# Patient Record
Sex: MALE | Race: WHITE | Employment: FULL TIME | URBAN - METROPOLITAN AREA
[De-identification: names, ages, dates, MRNs, and addresses within clinical notes are randomized per-mention and may not be internally consistent; named-entity substitution may affect disease eponyms.]

---

## 2018-02-20 ENCOUNTER — OFFICE VISIT (OUTPATIENT)
Dept: INTERNAL MEDICINE CLINIC | Age: 63
End: 2018-02-20

## 2018-02-20 VITALS
DIASTOLIC BLOOD PRESSURE: 80 MMHG | OXYGEN SATURATION: 98 % | HEART RATE: 61 BPM | WEIGHT: 217 LBS | HEIGHT: 73 IN | BODY MASS INDEX: 28.76 KG/M2 | SYSTOLIC BLOOD PRESSURE: 118 MMHG

## 2018-02-20 DIAGNOSIS — G89.29 CHRONIC PAIN OF BOTH SHOULDERS: ICD-10-CM

## 2018-02-20 DIAGNOSIS — M25.511 CHRONIC PAIN OF BOTH SHOULDERS: ICD-10-CM

## 2018-02-20 DIAGNOSIS — E53.8 B12 DEFICIENCY: ICD-10-CM

## 2018-02-20 DIAGNOSIS — M25.512 CHRONIC PAIN OF BOTH SHOULDERS: ICD-10-CM

## 2018-02-20 DIAGNOSIS — Z00.00 ANNUAL PHYSICAL EXAM: Primary | ICD-10-CM

## 2018-02-20 DIAGNOSIS — Z12.5 PROSTATE CANCER SCREENING: ICD-10-CM

## 2018-02-20 DIAGNOSIS — M25.561 RIGHT KNEE PAIN, UNSPECIFIED CHRONICITY: ICD-10-CM

## 2018-02-20 DIAGNOSIS — E78.00 PURE HYPERCHOLESTEROLEMIA: ICD-10-CM

## 2018-02-20 DIAGNOSIS — Z23 NEED FOR DIPHTHERIA-TETANUS-PERTUSSIS (TDAP) VACCINE: ICD-10-CM

## 2018-02-20 LAB
BILIRUBIN, POC: NORMAL
BLOOD URINE, POC: NORMAL
CLARITY, POC: CLEAR
COLOR, POC: NORMAL
GLUCOSE URINE, POC: NORMAL
KETONES, POC: NORMAL
LEUKOCYTE EST, POC: NORMAL
NITRITE, POC: NORMAL
PH, POC: 7
PROTEIN, POC: NORMAL
SPECIFIC GRAVITY, POC: 1.02
UROBILINOGEN, POC: 0.2

## 2018-02-20 PROCEDURE — 90471 IMMUNIZATION ADMIN: CPT | Performed by: INTERNAL MEDICINE

## 2018-02-20 PROCEDURE — 81002 URINALYSIS NONAUTO W/O SCOPE: CPT | Performed by: INTERNAL MEDICINE

## 2018-02-20 PROCEDURE — 99396 PREV VISIT EST AGE 40-64: CPT | Performed by: INTERNAL MEDICINE

## 2018-02-20 PROCEDURE — 90715 TDAP VACCINE 7 YRS/> IM: CPT | Performed by: INTERNAL MEDICINE

## 2018-02-20 ASSESSMENT — ENCOUNTER SYMPTOMS
ABDOMINAL PAIN: 0
CHEST TIGHTNESS: 0
NAUSEA: 0
EYE PAIN: 0
DIARRHEA: 0
VOMITING: 0
VOICE CHANGE: 0
RHINORRHEA: 0
WHEEZING: 0
ABDOMINAL DISTENTION: 0
SINUS PRESSURE: 0
SHORTNESS OF BREATH: 0
BACK PAIN: 0
TROUBLE SWALLOWING: 0
COLOR CHANGE: 0
COUGH: 0
CONSTIPATION: 0
APNEA: 0

## 2018-02-20 ASSESSMENT — PATIENT HEALTH QUESTIONNAIRE - PHQ9
2. FEELING DOWN, DEPRESSED OR HOPELESS: 0
SUM OF ALL RESPONSES TO PHQ9 QUESTIONS 1 & 2: 0
1. LITTLE INTEREST OR PLEASURE IN DOING THINGS: 0
SUM OF ALL RESPONSES TO PHQ QUESTIONS 1-9: 0

## 2018-03-20 LAB
A/G RATIO: 1.8 (CALC) (ref 1–2.5)
ALBUMIN SERPL-MCNC: 4.3 G/DL (ref 3.6–5.1)
ALP BLD-CCNC: 81 U/L (ref 40–115)
ALT SERPL-CCNC: 11 U/L (ref 9–46)
AST SERPL-CCNC: 13 U/L (ref 10–35)
BILIRUB SERPL-MCNC: 0.8 MG/DL (ref 0.2–1.2)
BUN / CREAT RATIO: ABNORMAL (CALC) (ref 6–22)
BUN BLDV-MCNC: 15 MG/DL (ref 7–25)
CALCIUM SERPL-MCNC: 9.2 MG/DL (ref 8.6–10.3)
CHLORIDE BLD-SCNC: 107 MMOL/L (ref 98–110)
CHOLESTEROL, TOTAL: 147 MG/DL
CHOLESTEROL/HDL RATIO: 3.2 (CALC)
CHOLESTEROL: 101 MG/DL (CALC)
CO2: 29 MMOL/L (ref 20–31)
CREAT SERPL-MCNC: 1.16 MG/DL (ref 0.7–1.25)
GFR AFRICAN AMERICAN: 77 ML/MIN/1.73M2
GFR SERPL CREATININE-BSD FRML MDRD: 67 ML/MIN/1.73M2
GLOBULIN: 2.4 G/DL (CALC) (ref 1.9–3.7)
GLUCOSE BLD-MCNC: 100 MG/DL (ref 65–99)
HDLC SERPL-MCNC: 46 MG/DL
LDL CHOLESTEROL CALCULATED: 85 MG/DL (CALC)
POTASSIUM SERPL-SCNC: 4.3 MMOL/L (ref 3.5–5.3)
PROSTATE SPECIFIC ANTIGEN: 0.9 NG/ML
SODIUM BLD-SCNC: 140 MMOL/L (ref 135–146)
TOTAL PROTEIN: 6.7 G/DL (ref 6.1–8.1)
TRIGL SERPL-MCNC: 69 MG/DL
VITAMIN B-12: 271 PG/ML (ref 200–1100)

## 2018-08-14 ENCOUNTER — TELEPHONE (OUTPATIENT)
Dept: INTERNAL MEDICINE CLINIC | Age: 63
End: 2018-08-14

## 2018-09-13 ENCOUNTER — TELEPHONE (OUTPATIENT)
Dept: INTERNAL MEDICINE CLINIC | Age: 63
End: 2018-09-13

## 2018-09-16 LAB
BUN BLDV-MCNC: 15 MG/DL
CALCIUM SERPL-MCNC: 8.8 MG/DL
CHLORIDE BLD-SCNC: 108 MMOL/L
CO2: 26 MMOL/L
CREAT SERPL-MCNC: 1.15 MG/DL
GFR CALCULATED: NORMAL
GLUCOSE BLD-MCNC: 107 MG/DL
HCT VFR BLD CALC: 41 % (ref 41–53)
HEMOGLOBIN: 14.3 G/DL (ref 13.5–17.5)
INR BLD: 1
PLATELET # BLD: 197 K/ΜL
POTASSIUM SERPL-SCNC: 4.4 MMOL/L
PROTIME: 10.3 SECONDS
SODIUM BLD-SCNC: 139 MMOL/L
WBC # BLD: 4.1 10^3/ML

## 2018-09-17 ENCOUNTER — OFFICE VISIT (OUTPATIENT)
Dept: INTERNAL MEDICINE CLINIC | Age: 63
End: 2018-09-17

## 2018-09-17 VITALS
HEIGHT: 74 IN | DIASTOLIC BLOOD PRESSURE: 70 MMHG | WEIGHT: 215.6 LBS | SYSTOLIC BLOOD PRESSURE: 112 MMHG | TEMPERATURE: 98.3 F | BODY MASS INDEX: 27.67 KG/M2 | HEART RATE: 62 BPM | OXYGEN SATURATION: 98 %

## 2018-09-17 DIAGNOSIS — G89.29 CHRONIC RIGHT SHOULDER PAIN: ICD-10-CM

## 2018-09-17 DIAGNOSIS — Z01.818 PREOPERATIVE EXAMINATION: Primary | ICD-10-CM

## 2018-09-17 DIAGNOSIS — M25.511 CHRONIC RIGHT SHOULDER PAIN: ICD-10-CM

## 2018-09-17 PROCEDURE — 93000 ELECTROCARDIOGRAM COMPLETE: CPT | Performed by: INTERNAL MEDICINE

## 2018-09-17 PROCEDURE — 99242 OFF/OP CONSLTJ NEW/EST SF 20: CPT | Performed by: INTERNAL MEDICINE

## 2018-09-17 ASSESSMENT — ENCOUNTER SYMPTOMS
CONSTIPATION: 0
VOMITING: 0
RHINORRHEA: 0
DIARRHEA: 0
ABDOMINAL PAIN: 0
SORE THROAT: 0
TROUBLE SWALLOWING: 0
COUGH: 0
NAUSEA: 0
SHORTNESS OF BREATH: 0
WHEEZING: 0

## 2018-09-17 NOTE — PROGRESS NOTES
2018     Liam Burrows (:  1955) is a 58 y.o. male, here for evaluation of the following medical concerns:    Chief Complaint   Patient presents with    Pre-op Exam     [facility] Et[facility] fax [phone] 18, total right shoulder replacement    Shoulder Pain        HPI    Planned right shoulder replacement one week from today    No recent illness  No fever/chills    Had blood work done elsewhere, [facility]    Needs ekg and mrsa swab      Current Outpatient Prescriptions   Medication Sig Dispense Refill    vitamin B-12 (CYANOCOBALAMIN) 500 MCG tablet Take 1 tablet by mouth daily 30 tablet 3    cetirizine (ZYRTEC ALLERGY) 10 MG tablet Take 1 tablet by mouth daily as needed for Allergies      Multiple Vitamins-Minerals (MULTIVITAMIN WITH MINERALS) tablet Take 2 tablets by mouth daily. 30 tablet 3     No current facility-administered medications for this visit. Allergies:  No Known Allergies  Prior Medical History:       Diagnosis Date    Hyperlipidemia      Social History  Social History   Substance Use Topics    Smoking status: Former Smoker     Packs/day: 1.00     Years: 15.00     Quit date: 1995    Smokeless tobacco: Former User     Quit date: 1995      Comment: only used chew for a few months    Alcohol use 0.0 oz/week      Comment: 6 beers/month     Prior Surgical History:      Procedure Laterality Date    COLONOSCOPY  ? Dr. Leonard Lainez - 5 year f/u    TONSILLECTOMY      WISDOM TOOTH EXTRACTION       Family History:   Family History   Problem Relation Age of Onset    Cancer Father 62        lung - smoker    Heart Disease Paternal Grandmother         ?  Diabetes Mother         not on medication    Diabetes Paternal Aunt          Review of Systems   Constitutional: Negative for appetite change, chills, fever and unexpected weight change. HENT: Negative for dental problem, rhinorrhea, sore throat and trouble swallowing. Respiratory: Negative for cough, shortness of breath and wheezing. Cardiovascular: Negative for chest pain, palpitations and leg swelling. Gastrointestinal: Negative for abdominal pain, constipation, diarrhea, nausea and vomiting. Endocrine: Negative for cold intolerance and heat intolerance. Genitourinary: Negative for difficulty urinating, dysuria and hematuria. Musculoskeletal: Positive for arthralgias. Negative for myalgias and neck pain. Skin: Negative for rash and wound. Allergic/Immunologic: Positive for environmental allergies (minor as child, not now). Negative for food allergies. Neurological: Negative for weakness, numbness and headaches. Hematological: Negative for adenopathy. Does not bruise/bleed easily. Psychiatric/Behavioral: Negative for dysphoric mood and suicidal ideas. The patient is not nervous/anxious. Prior to Visit Medications    Medication Sig Taking? Authorizing Provider   vitamin B-12 (CYANOCOBALAMIN) 500 MCG tablet Take 1 tablet by mouth daily  Adolfo Bonner MD   cetirizine (ZYRTEC ALLERGY) 10 MG tablet Take 1 tablet by mouth daily as needed for Allergies  Adolfo Bonner MD   Multiple Vitamins-Minerals (MULTIVITAMIN WITH MINERALS) tablet Take 2 tablets by mouth daily. Adolfo Bonner MD        Social History   Substance Use Topics    Smoking status: Former Smoker     Packs/day: 1.00     Years: 15.00     Quit date: 1/27/1995    Smokeless tobacco: Former User     Quit date: 1/27/1995      Comment: only used chew for a few months    Alcohol use 0.0 oz/week      Comment: 6 beers/month        Vitals:    09/17/18 1012   BP: 112/70   Site: Left Upper Arm   Position: Sitting   Cuff Size: Medium Adult   Pulse: 62   Temp: 98.3 °F (36.8 °C)   TempSrc: Oral   SpO2: 98%   Weight: 215 lb 9.6 oz (97.8 kg)   Height: 6' 2.4\" (1.89 m)     Estimated body mass index is 27.38 kg/m² as calculated from the following:    Height as of this encounter: 6' 2.4\" (1.89 m). Weight as of this encounter: 215 lb 9.6 oz (97.8 kg). Physical Exam   Constitutional: He is oriented to person, place, and time. He appears well-developed and well-nourished. HENT:   Right Ear: External ear normal.   Left Ear: External ear normal.   Nose: Nose normal.   Mouth/Throat: Oropharynx is clear and moist. No oropharyngeal exudate. Eyes: Pupils are equal, round, and reactive to light. No scleral icterus. Neck: Normal range of motion. No JVD present. Cardiovascular: Normal rate, regular rhythm, normal heart sounds and intact distal pulses. Pulmonary/Chest: Effort normal and breath sounds normal.   Abdominal: Soft. Bowel sounds are normal.   Musculoskeletal: He exhibits no edema. Neurological: He is alert and oriented to person, place, and time. He has normal reflexes. No cranial nerve deficit. Psychiatric: He has a normal mood and affect. His behavior is normal. Judgment and thought content normal.       ASSESSMENT/PLAN:  1. Preoperative examination  ASA 1  This patient appears to be in an acceptable condition for the proposed procedure. mrsa screen sent  ekg normal    Labs will be reviewed when received    2. Chronic right shoulder pain  Planned shoulder replacement  Sleep, limitations after surgery discussed      No Follow-up on file. An electronic signature was used to authenticate this note.     --Estevan Motne MD on 9/17/2018 at 10:45 AM

## 2018-09-17 NOTE — PATIENT INSTRUCTIONS
Stop all vitamin E, fish oil, aspirin, ibuprofen, aleve, or any other arthritis pill (Tylenol/acetaminophen is   OK) for seven days prior to the procedure. Eat and drink nothing after midnight the night before the planned procedure.

## 2018-09-19 ENCOUNTER — TELEPHONE (OUTPATIENT)
Dept: INTERNAL MEDICINE CLINIC | Age: 63
End: 2018-09-19

## 2018-09-19 ENCOUNTER — CLINICAL DOCUMENTATION (OUTPATIENT)
Dept: INTERNAL MEDICINE CLINIC | Age: 63
End: 2018-09-19

## 2018-09-19 DIAGNOSIS — Z01.818 PREOPERATIVE EXAMINATION: Primary | ICD-10-CM

## 2018-09-19 DIAGNOSIS — Z01.818 PREOPERATIVE EXAMINATION: ICD-10-CM

## 2018-09-19 LAB
BILIRUBIN URINE: ABNORMAL
BLOOD, URINE: NEGATIVE
CLARITY: CLEAR
COLOR: ABNORMAL
CULTURE NOSE: NORMAL
GLUCOSE URINE: NEGATIVE MG/DL
KETONES, URINE: NEGATIVE MG/DL
LEUKOCYTE ESTERASE, URINE: NEGATIVE
MICROSCOPIC EXAMINATION: ABNORMAL
NITRITE, URINE: NEGATIVE
PH UA: 5.5
PROTEIN UA: NEGATIVE MG/DL
SPECIFIC GRAVITY UA: 1.03
URINE TYPE: ABNORMAL
UROBILINOGEN, URINE: 0.2 E.U./DL

## 2018-09-19 NOTE — TELEPHONE ENCOUNTER
Pt called in wanting to do urinanalysis today per what Dr. Channing Pate is wanting done. Is there a way that the order can be placed in pt's chart for him to have that done today?    Please call pt back with information on this further at [phone]

## 2018-11-28 ENCOUNTER — OFFICE VISIT (OUTPATIENT)
Dept: INTERNAL MEDICINE CLINIC | Age: 63
End: 2018-11-28
Payer: COMMERCIAL

## 2018-11-28 VITALS
HEART RATE: 84 BPM | WEIGHT: 220 LBS | DIASTOLIC BLOOD PRESSURE: 82 MMHG | SYSTOLIC BLOOD PRESSURE: 126 MMHG | RESPIRATION RATE: 22 BRPM | TEMPERATURE: 98.8 F | BODY MASS INDEX: 27.94 KG/M2 | OXYGEN SATURATION: 95 %

## 2018-11-28 DIAGNOSIS — R73.9 HYPERGLYCEMIA: ICD-10-CM

## 2018-11-28 DIAGNOSIS — Z01.818 PREOPERATIVE EXAMINATION: Primary | ICD-10-CM

## 2018-11-28 DIAGNOSIS — G56.01 RIGHT CARPAL TUNNEL SYNDROME: ICD-10-CM

## 2018-11-28 PROCEDURE — 99242 OFF/OP CONSLTJ NEW/EST SF 20: CPT | Performed by: INTERNAL MEDICINE

## 2018-11-28 RX ORDER — TRAMADOL HYDROCHLORIDE 50 MG/1
50 TABLET ORAL EVERY 6 HOURS PRN
COMMUNITY
End: 2019-04-02 | Stop reason: CLARIF

## 2018-11-28 ASSESSMENT — ENCOUNTER SYMPTOMS
DIARRHEA: 0
NAUSEA: 0
COUGH: 0
CONSTIPATION: 0
SHORTNESS OF BREATH: 0
ABDOMINAL PAIN: 0
WHEEZING: 0
SORE THROAT: 0
TROUBLE SWALLOWING: 0
VOMITING: 0
RHINORRHEA: 0

## 2018-11-29 LAB
ATYPICAL LYMPHOCYTE RELATIVE PERCENT: ABNORMAL % (ref 0–10)
BAND NEUTROPHILS: ABNORMAL %
BANDED NEUTROPHILS ABSOLUTE COUNT: ABNORMAL CELLS/UL (ref 0–750)
BASOPHILS ABSOLUTE: 42 CELLS/UL (ref 0–200)
BASOPHILS RELATIVE PERCENT: 0.7 %
BLASTS ABSOLUTE COUNT: ABNORMAL CELLS/UL
BLASTS RELATIVE PERCENT: ABNORMAL %
BUN / CREAT RATIO: NORMAL (CALC) (ref 6–22)
BUN BLDV-MCNC: 16 MG/DL (ref 7–25)
CALCIUM SERPL-MCNC: 9.3 MG/DL (ref 8.6–10.3)
CHLORIDE BLD-SCNC: 104 MMOL/L (ref 98–110)
CO2: 29 MMOL/L (ref 20–32)
COMMENT: ABNORMAL
CREAT SERPL-MCNC: 1.15 MG/DL (ref 0.7–1.25)
EOSINOPHILS ABSOLUTE: 180 CELLS/UL (ref 15–500)
EOSINOPHILS RELATIVE PERCENT: 3 %
GFR AFRICAN AMERICAN: 78 ML/MIN/1.73M2
GFR SERPL CREATININE-BSD FRML MDRD: 67 ML/MIN/1.73M2
GLUCOSE BLD-MCNC: 97 MG/DL (ref 65–99)
HBA1C MFR BLD: 5.1 % OF TOTAL HGB
HCT VFR BLD CALC: 42.8 % (ref 38.5–50)
HEMOGLOBIN: 15.2 G/DL (ref 13.2–17.1)
LYMPHOCYTES ABSOLUTE: 1434 CELLS/UL (ref 850–3900)
LYMPHOCYTES RELATIVE PERCENT: 23.9 %
MCH RBC QN AUTO: 33.4 PG (ref 27–33)
MCHC RBC AUTO-ENTMCNC: 35.5 G/DL (ref 32–36)
MCV RBC AUTO: 94.1 FL (ref 80–100)
METAMYELOCYTES ABSOLUTE COUNT: ABNORMAL CELLS/UL
METAMYELOCYTES RELATIVE PERCENT: ABNORMAL %
MONOCYTES ABSOLUTE: 570 CELLS/UL (ref 200–950)
MONOCYTES RELATIVE PERCENT: 9.5 %
MYELOCYTE PERCENT: ABNORMAL %
MYELOCYTES ABSOLUTE COUNT: ABNORMAL CELLS/UL
NEUTROPHILS ABSOLUTE: 3774 CELLS/UL (ref 1500–7800)
NUCLEATED RED BLOOD CELLS: ABNORMAL /100 WBC
NUCLEATED RED BLOOD CELLS: ABNORMAL CELLS/UL
PDW BLD-RTO: 11.7 % (ref 11–15)
PLATELET # BLD: 237 THOUSAND/UL (ref 140–400)
PMV BLD AUTO: 10 FL (ref 7.5–12.5)
POTASSIUM SERPL-SCNC: 4.1 MMOL/L (ref 3.5–5.3)
PROMYELOCYTES ABSOLUTE COUNT: ABNORMAL CELLS/UL
PROMYELOCYTES PERCENT: ABNORMAL %
RBC # BLD: 4.55 MILLION/UL (ref 4.2–5.8)
SEGMENTED NEUTROPHILS RELATIVE PERCENT: 62.9 %
SODIUM BLD-SCNC: 141 MMOL/L (ref 135–146)
WBC # BLD: 6 THOUSAND/UL (ref 3.8–10.8)

## 2019-04-02 ENCOUNTER — OFFICE VISIT (OUTPATIENT)
Dept: INTERNAL MEDICINE CLINIC | Age: 64
End: 2019-04-02
Payer: COMMERCIAL

## 2019-04-02 VITALS
BODY MASS INDEX: 28.21 KG/M2 | SYSTOLIC BLOOD PRESSURE: 116 MMHG | RESPIRATION RATE: 16 BRPM | HEIGHT: 74 IN | DIASTOLIC BLOOD PRESSURE: 76 MMHG | OXYGEN SATURATION: 97 % | TEMPERATURE: 98.4 F | WEIGHT: 219.8 LBS | HEART RATE: 60 BPM

## 2019-04-02 DIAGNOSIS — E78.00 PURE HYPERCHOLESTEROLEMIA: ICD-10-CM

## 2019-04-02 DIAGNOSIS — Z12.5 SCREENING FOR PROSTATE CANCER: ICD-10-CM

## 2019-04-02 DIAGNOSIS — E53.8 B12 DEFICIENCY: ICD-10-CM

## 2019-04-02 DIAGNOSIS — Z00.00 PHYSICAL EXAM, ANNUAL: Primary | ICD-10-CM

## 2019-04-02 DIAGNOSIS — R73.9 HYPERGLYCEMIA: ICD-10-CM

## 2019-04-02 PROCEDURE — 99396 PREV VISIT EST AGE 40-64: CPT | Performed by: INTERNAL MEDICINE

## 2019-04-02 ASSESSMENT — PATIENT HEALTH QUESTIONNAIRE - PHQ9
SUM OF ALL RESPONSES TO PHQ9 QUESTIONS 1 & 2: 0
2. FEELING DOWN, DEPRESSED OR HOPELESS: 0
SUM OF ALL RESPONSES TO PHQ QUESTIONS 1-9: 0
SUM OF ALL RESPONSES TO PHQ QUESTIONS 1-9: 0
1. LITTLE INTEREST OR PLEASURE IN DOING THINGS: 0

## 2019-04-02 NOTE — PROGRESS NOTES
Chief complaints:  America Stacy is a 58 y.o. male who presents to the office for a general physical examination. History of present illness:  Here for a physical exam and blood work,  Pure hypercholesterolemia  This has been a chronic problem, takes meds regularly. Monitors diet and tries to follow a low fat diet. Not been very compliant w exercise. Lipids have been stable, The problem is controlled. Recent lipid tests were reviewed and are normal. Pertinent negatives include no chest pain, focal sensory loss, focal weakness, leg pain, myalgias or shortness of breath. Advised patient to continue the current instructions or medications. B12 deficiency  Suggest otc replacement. Past Medical History:   Diagnosis Date    Hyperlipidemia      Current Outpatient Medications   Medication Sig Dispense Refill    Multiple Vitamins-Minerals (MULTIVITAMIN WITH MINERALS) tablet Take 2 tablets by mouth daily. 30 tablet 3    vitamin B-12 (CYANOCOBALAMIN) 500 MCG tablet Take 1 tablet by mouth daily 30 tablet 3    cetirizine (ZYRTEC ALLERGY) 10 MG tablet Take 1 tablet by mouth daily as needed for Allergies       No current facility-administered medications for this visit. Allergies : Patient has no known allergies. Past Surgical History:   Procedure Laterality Date    COLONOSCOPY  ? Dr. Dina Unger - 5 year f/u    TONSILLECTOMY      WISDOM TOOTH EXTRACTION       Family History   Problem Relation Age of Onset    Cancer Father 62        lung - smoker    Heart Disease Paternal Grandmother         ?     Diabetes Mother         not on medication    Diabetes Paternal Aunt      Social History     Tobacco Use    Smoking status: Former Smoker     Packs/day: 1.00     Years: 15.00     Pack years: 15.00     Last attempt to quit: 1995     Years since quittin.1    Smokeless tobacco: Former User     Quit date: 1995    Tobacco comment: only used chew for a few months   Substance Use Topics  Alcohol use: Yes     Alcohol/week: 0.0 oz     Comment: 6 beers/month       Review of systems :  Skin: no abnormal pigmentation, rash, scaling, itching, masses, hair or nail changes  Eyes: negative  Ears/Nose/Throat: negative  Respiratory: negative  Cardiovascular: negative  Gastrointestinal: negative  Genitourinary: negative  Musculoskeletal: negative  Neurologic: negative  Psychiatric: negative  Hematologic/Lymphatic/Immunologic: negative  Endocrine: negative       Objective :     /76 (Site: Right Upper Arm, Position: Sitting, Cuff Size: Large Adult)   Pulse 60   Temp 98.4 °F (36.9 °C) (Oral)   Resp 16   Ht 6' 2\" (1.88 m)   Wt 219 lb 12.8 oz (99.7 kg)   SpO2 97%   BMI 28.22 kg/m²   General appearance - healthy, alert, no distress  Skin - Skin color, texture, turgor normal. No rashes or lesions. Head - Normocephalic. No masses, lesions, tenderness or abnormalities  Eyes - conjunctivae/corneas clear. PERRL, EOM's intact. Ears - External ears normal. Canals clear. TM's normal.  Nose/Sinuses - Nares normal. Septum midline. Mucosa normal. No drainage or sinus tenderness. Oropharynx - Lips, mucosa, and tongue normal. Teeth and gums normal. Oropharynx pink and patent  Neck - Neck supple. No adenopathy. Thyroid symmetric, normal size,  Back - Back symmetric, no curvature. ROM normal. No CVA tenderness. Lungs - Percussion normal. Good diaphragmatic excursion. Lungs clear  Heart - Regular rate and rhythm, with no rub, murmur or gallop noted. Abdomen - Abdomen soft, non-tender. BS normal. No masses, organomegaly  Extremities - Extremities normal. No deformities, edema, or skin discolora  Musculoskeletal - Spine ROM normal. Muscular strength intact. Peripheral pulses - radial=2+,, femoral=2+, popliteal=2+, dorsalis pedis=2+,  Neuro - Gait normal. Reflexes normal and symmetric.  Sensation grossly normal.  No focal weakness       Assessment :     Physical exam  Pure hypercholesterolemia  This has been a chronic problem, takes meds regularly. Monitors diet and tries to follow a low fat diet. Not been very compliant w exercise. Lipids have been stable, The problem is controlled. Recent lipid tests were reviewed and are normal. Pertinent negatives include no chest pain, focal sensory loss, focal weakness, leg pain, myalgias or shortness of breath. Advised patient to continue the current instructions or medications. B12 deficiency  Suggest otc replacement. Plan : Will get blood work,  F/u in 1 year.     Zaira Pappas MD  4/2/2019 2:24 PM

## 2019-04-09 LAB
A/G RATIO: 1.9 (CALC) (ref 1–2.5)
ALBUMIN SERPL-MCNC: 4.1 G/DL (ref 3.6–5.1)
ALP BLD-CCNC: 76 U/L (ref 40–115)
ALT SERPL-CCNC: 11 U/L (ref 9–46)
AST SERPL-CCNC: 15 U/L (ref 10–35)
ATYPICAL LYMPHOCYTE RELATIVE PERCENT: ABNORMAL % (ref 0–10)
BAND NEUTROPHILS: ABNORMAL %
BANDED NEUTROPHILS ABSOLUTE COUNT: ABNORMAL CELLS/UL (ref 0–750)
BASOPHILS ABSOLUTE: 32 CELLS/UL (ref 0–200)
BASOPHILS RELATIVE PERCENT: 0.6 %
BILIRUB SERPL-MCNC: 0.7 MG/DL (ref 0.2–1.2)
BLASTS ABSOLUTE COUNT: ABNORMAL CELLS/UL
BLASTS RELATIVE PERCENT: ABNORMAL %
BUN / CREAT RATIO: ABNORMAL (CALC) (ref 6–22)
BUN BLDV-MCNC: 14 MG/DL (ref 7–25)
CALCIUM SERPL-MCNC: 8.8 MG/DL (ref 8.6–10.3)
CHLORIDE BLD-SCNC: 109 MMOL/L (ref 98–110)
CHOLESTEROL, TOTAL: 149 MG/DL
CHOLESTEROL/HDL RATIO: 3.1 (CALC)
CHOLESTEROL: 101 MG/DL (CALC)
CO2: 23 MMOL/L (ref 20–32)
COMMENT: ABNORMAL
CREAT SERPL-MCNC: 1.02 MG/DL (ref 0.7–1.25)
EOSINOPHILS ABSOLUTE: 151 CELLS/UL (ref 15–500)
EOSINOPHILS RELATIVE PERCENT: 2.8 %
GFR AFRICAN AMERICAN: 90 ML/MIN/1.73M2
GFR, ESTIMATED: 77 ML/MIN/1.73M2
GLOBULIN: 2.2 G/DL (CALC) (ref 1.9–3.7)
GLUCOSE BLD-MCNC: 103 MG/DL (ref 65–99)
HBA1C MFR BLD: 5.1 % OF TOTAL HGB
HCT VFR BLD CALC: 41.1 % (ref 38.5–50)
HDLC SERPL-MCNC: 48 MG/DL
HEMOGLOBIN: 14.8 G/DL (ref 13.2–17.1)
LDL CHOLESTEROL CALCULATED: 86 MG/DL (CALC)
LYMPHOCYTES ABSOLUTE: 1247 CELLS/UL (ref 850–3900)
LYMPHOCYTES RELATIVE PERCENT: 23.1 %
MCH RBC QN AUTO: 34.4 PG (ref 27–33)
MCHC RBC AUTO-ENTMCNC: 36 G/DL (ref 32–36)
MCV RBC AUTO: 95.6 FL (ref 80–100)
METAMYELOCYTES ABSOLUTE COUNT: ABNORMAL CELLS/UL
METAMYELOCYTES RELATIVE PERCENT: ABNORMAL %
MONOCYTES ABSOLUTE: 432 CELLS/UL (ref 200–950)
MONOCYTES RELATIVE PERCENT: 8 %
MYELOCYTE PERCENT: ABNORMAL %
MYELOCYTES ABSOLUTE COUNT: ABNORMAL CELLS/UL
NEUTROPHILS ABSOLUTE: 3537 CELLS/UL (ref 1500–7800)
NUCLEATED RED BLOOD CELLS: ABNORMAL /100 WBC
NUCLEATED RED BLOOD CELLS: ABNORMAL CELLS/UL
PDW BLD-RTO: 11.7 % (ref 11–15)
PLATELET # BLD: 229 THOUSAND/UL (ref 140–400)
PMV BLD AUTO: 10 FL (ref 7.5–12.5)
POTASSIUM SERPL-SCNC: 4.4 MMOL/L (ref 3.5–5.3)
PROMYELOCYTES ABSOLUTE COUNT: ABNORMAL CELLS/UL
PROMYELOCYTES PERCENT: ABNORMAL %
PROSTATE SPECIFIC ANTIGEN: 0.8 NG/ML
RBC # BLD: 4.3 MILLION/UL (ref 4.2–5.8)
SEGMENTED NEUTROPHILS RELATIVE PERCENT: 65.5 %
SODIUM BLD-SCNC: 140 MMOL/L (ref 135–146)
TOTAL PROTEIN: 6.3 G/DL (ref 6.1–8.1)
TRIGL SERPL-MCNC: 60 MG/DL
TSH ULTRASENSITIVE: 1.08 MIU/L (ref 0.4–4.5)
WBC # BLD: 5.4 THOUSAND/UL (ref 3.8–10.8)

## 2019-08-30 ENCOUNTER — APPOINTMENT (RX ONLY)
Dept: URBAN - METROPOLITAN AREA CLINIC 170 | Facility: CLINIC | Age: 64
Setting detail: DERMATOLOGY
End: 2019-08-30

## 2019-08-30 DIAGNOSIS — Z02.9 ENCOUNTER FOR ADMINISTRATIVE EXAMINATIONS, UNSPECIFIED: ICD-10-CM

## 2019-08-30 DIAGNOSIS — L82.0 INFLAMED SEBORRHEIC KERATOSIS: ICD-10-CM

## 2019-08-30 PROCEDURE — ? BENIGN DESTRUCTION

## 2019-08-30 PROCEDURE — 17110 DESTRUCTION B9 LES UP TO 14: CPT

## 2019-08-30 ASSESSMENT — LOCATION ZONE DERM
LOCATION ZONE: EYELID
LOCATION ZONE: SCALP

## 2019-08-30 ASSESSMENT — LOCATION SIMPLE DESCRIPTION DERM
LOCATION SIMPLE: LEFT SUPERIOR EYELID
LOCATION SIMPLE: SCALP

## 2019-08-30 ASSESSMENT — LOCATION DETAILED DESCRIPTION DERM
LOCATION DETAILED: LEFT CENTRAL FRONTAL SCALP
LOCATION DETAILED: LEFT LATERAL SUPERIOR EYELID

## 2019-08-30 NOTE — PROCEDURE: BENIGN DESTRUCTION
Detail Level: Detailed
Consent: The patient's consent was obtained including but not limited to risks of crusting, scabbing, blistering, scarring, darker or lighter pigmentary change, recurrence, incomplete removal and infection.
Treatment Number (Will Not Render If 0): 0
Post-Care Instructions: I reviewed with the patient in detail post-care instructions. Patient is to wear sunprotection, and avoid picking at any of the treated lesions. Pt may apply Vaseline to crusted or scabbing areas.
Include Z78.9 (Other Specified Conditions Influencing Health Status) As An Associated Diagnosis?: No
Medical Necessity Clause: This procedure was medically necessary because the lesions that were treated were:
Anesthesia Volume In Cc: 0.5
Render Post-Care Instructions In Note?: yes
Medical Necessity Information: It is in your best interest to select a reason for this procedure from the list below. All of these items fulfill various CMS LCD requirements except the new and changing color options.

## 2019-08-30 NOTE — HPI: SKIN LESION
What Type Of Note Output Would You Prefer (Optional)?: Bullet Format
How Severe Is Your Skin Lesion?: mild
Has Your Skin Lesion Been Treated?: been treated
Is This A New Presentation, Or A Follow-Up?: Growths
Additional History: Patient wants left scalp growth removed. Removed in 2016.

## 2019-12-13 ENCOUNTER — APPOINTMENT (RX ONLY)
Dept: URBAN - METROPOLITAN AREA CLINIC 170 | Facility: CLINIC | Age: 64
Setting detail: DERMATOLOGY
End: 2019-12-13

## 2019-12-13 DIAGNOSIS — L57.0 ACTINIC KERATOSIS: ICD-10-CM

## 2019-12-13 PROCEDURE — ? LIQUID NITROGEN

## 2019-12-13 PROCEDURE — 17000 DESTRUCT PREMALG LESION: CPT

## 2019-12-13 PROCEDURE — 17003 DESTRUCT PREMALG LES 2-14: CPT

## 2019-12-13 PROCEDURE — ? COUNSELING

## 2019-12-13 ASSESSMENT — LOCATION SIMPLE DESCRIPTION DERM
LOCATION SIMPLE: LEFT ZYGOMA
LOCATION SIMPLE: SCALP

## 2019-12-13 ASSESSMENT — LOCATION DETAILED DESCRIPTION DERM
LOCATION DETAILED: LEFT CENTRAL FRONTAL SCALP
LOCATION DETAILED: LEFT CENTRAL ZYGOMA

## 2019-12-13 ASSESSMENT — LOCATION ZONE DERM
LOCATION ZONE: SCALP
LOCATION ZONE: FACE

## 2019-12-13 NOTE — PROCEDURE: LIQUID NITROGEN
Number Of Freeze-Thaw Cycles: 1 freeze-thaw cycle
Post-Care Instructions: I reviewed with the patient in detail post-care instructions. Patient is to wear sunprotection, and avoid picking at any of the treated lesions. Pt may apply Vaseline to crusted or scabbing areas.
Duration Of Freeze Thaw-Cycle (Seconds): 5
Render Note In Bullet Format When Appropriate: No
Consent: The patient's consent was obtained including but not limited to risks of crusting, scabbing, blistering, scarring, darker or lighter pigmentary change, recurrence, incomplete removal and infection.
Detail Level: Detailed
Render Post-Care Instructions In Note?: yes

## 2020-01-02 ENCOUNTER — OFFICE VISIT (OUTPATIENT)
Dept: PRIMARY CARE CLINIC | Age: 65
End: 2020-01-02
Payer: COMMERCIAL

## 2020-01-02 VITALS
OXYGEN SATURATION: 97 % | DIASTOLIC BLOOD PRESSURE: 77 MMHG | RESPIRATION RATE: 12 BRPM | SYSTOLIC BLOOD PRESSURE: 117 MMHG | WEIGHT: 213 LBS | HEART RATE: 58 BPM | BODY MASS INDEX: 27.35 KG/M2

## 2020-01-02 PROCEDURE — 99397 PER PM REEVAL EST PAT 65+ YR: CPT | Performed by: INTERNAL MEDICINE

## 2020-01-02 ASSESSMENT — PATIENT HEALTH QUESTIONNAIRE - PHQ9
1. LITTLE INTEREST OR PLEASURE IN DOING THINGS: 0
SUM OF ALL RESPONSES TO PHQ9 QUESTIONS 1 & 2: 0
SUM OF ALL RESPONSES TO PHQ QUESTIONS 1-9: 0
SUM OF ALL RESPONSES TO PHQ QUESTIONS 1-9: 0
2. FEELING DOWN, DEPRESSED OR HOPELESS: 0

## 2020-01-02 NOTE — ASSESSMENT & PLAN NOTE
This has been a long standing problem, takes no meds Monitors diet and tries to follow a low fat diet. Has  been reasonably  compliant w exercise. Lipids have been stable, The problem is controlled. Recent lipid tests were reviewed and are normal. Pertinent negatives include no chest pain, focal sensory loss, focal weakness, leg pain, myalgias or shortness of breath. Advised patient to continue the current instructions or medications.

## 2020-01-02 NOTE — PROGRESS NOTES
Former User     Quit date: 1/27/1995    Tobacco comment: only used chew for a few months   Substance Use Topics    Alcohol use: Yes     Alcohol/week: 0.0 standard drinks     Comment: 6 beers/month       Review of systems :  Skin: no abnormal pigmentation, rash, scaling, itching, masses, hair or nail changes  Eyes: negative  Ears/Nose/Throat: negative  Respiratory: negative  Cardiovascular: negative  Gastrointestinal: negative  Genitourinary: negative  Musculoskeletal: negative  Neurologic: negative  Psychiatric: negative  Hematologic/Lymphatic/Immunologic: negative  Endocrine: negative       Objective :     /77 (Site: Left Upper Arm, Position: Sitting, Cuff Size: Large Adult)   Pulse 58   Resp 12   Wt 213 lb (96.6 kg)   SpO2 97%   BMI 27.35 kg/m²   General appearance - healthy, alert, no distress  Skin - Skin color, texture, turgor normal. No rashes or lesions. Head - Normocephalic. No masses, lesions, tenderness or abnormalities  Eyes - conjunctivae/corneas clear. PERRL, EOM's intact. Ears - External ears normal. Canals clear. TM's normal.  Nose/Sinuses - Nares normal. Septum midline. Mucosa normal. No drainage or sinus tenderness. Oropharynx - Lips, mucosa, and tongue normal. Teeth and gums normal. Oropharynx pink and patent  Neck - Neck supple. No adenopathy. Thyroid symmetric, normal size,  Back - Back symmetric, no curvature. ROM normal. No CVA tenderness. Lungs - Percussion normal. Good diaphragmatic excursion. Lungs clear  Heart - Regular rate and rhythm, with no rub, murmur or gallop noted. Abdomen - Abdomen soft, non-tender. BS normal. No masses, organomegaly  Extremities - Extremities normal. No deformities, edema, or skin discolora  Musculoskeletal - Spine ROM normal. Muscular strength intact. Peripheral pulses - radial=2+,, femoral=2+, popliteal=2+, dorsalis pedis=2+,  Neuro - Gait normal. Reflexes normal and symmetric.  Sensation grossly normal.  No focal weakness       Assessment :

## 2020-01-07 LAB
A/G RATIO: 1.7 (CALC) (ref 1–2.5)
ALBUMIN SERPL-MCNC: 4 G/DL (ref 3.6–5.1)
ALP BLD-CCNC: 75 U/L (ref 40–115)
ALT SERPL-CCNC: 11 U/L (ref 9–46)
AST SERPL-CCNC: 16 U/L (ref 10–35)
BASOPHILS ABSOLUTE: 18 CELLS/UL (ref 0–200)
BASOPHILS RELATIVE PERCENT: 0.4 %
BILIRUB SERPL-MCNC: 0.8 MG/DL (ref 0.2–1.2)
BUN / CREAT RATIO: ABNORMAL (CALC) (ref 6–22)
BUN BLDV-MCNC: 18 MG/DL (ref 7–25)
CALCIUM SERPL-MCNC: 9 MG/DL (ref 8.6–10.3)
CHLORIDE BLD-SCNC: 104 MMOL/L (ref 98–110)
CHOLESTEROL, TOTAL: 171 MG/DL
CHOLESTEROL/HDL RATIO: 3.3 (CALC)
CHOLESTEROL: 119 MG/DL (CALC)
CO2: 23 MMOL/L (ref 20–32)
CREAT SERPL-MCNC: 1.12 MG/DL (ref 0.7–1.25)
EOSINOPHILS ABSOLUTE: 147 CELLS/UL (ref 15–500)
EOSINOPHILS RELATIVE PERCENT: 3.2 %
GFR AFRICAN AMERICAN: 79 ML/MIN/1.73M2
GFR, ESTIMATED: 69 ML/MIN/1.73M2
GLOBULIN: 2.4 G/DL (CALC) (ref 1.9–3.7)
GLUCOSE BLD-MCNC: 52 MG/DL (ref 65–99)
HBA1C MFR BLD: 5.1 % OF TOTAL HGB
HCT VFR BLD CALC: 41.5 % (ref 38.5–50)
HDLC SERPL-MCNC: 52 MG/DL
HEMOGLOBIN: 14.8 G/DL (ref 13.2–17.1)
LDL CHOLESTEROL CALCULATED: 104 MG/DL (CALC)
LYMPHOCYTES ABSOLUTE: 1467 CELLS/UL (ref 850–3900)
LYMPHOCYTES RELATIVE PERCENT: 31.9 %
MCH RBC QN AUTO: 34.7 PG (ref 27–33)
MCHC RBC AUTO-ENTMCNC: 35.7 G/DL (ref 32–36)
MCV RBC AUTO: 97.2 FL (ref 80–100)
MONOCYTES ABSOLUTE: 455 CELLS/UL (ref 200–950)
MONOCYTES RELATIVE PERCENT: 9.9 %
NEUTROPHILS ABSOLUTE: 2512 CELLS/UL (ref 1500–7800)
PDW BLD-RTO: 11.7 % (ref 11–15)
PLATELET # BLD: 213 THOUSAND/UL (ref 140–400)
PMV BLD AUTO: 10 FL (ref 7.5–12.5)
POTASSIUM SERPL-SCNC: 4.6 MMOL/L (ref 3.5–5.3)
PROSTATE SPECIFIC ANTIGEN: 1 NG/ML
RBC # BLD: 4.27 MILLION/UL (ref 4.2–5.8)
SEGMENTED NEUTROPHILS RELATIVE PERCENT: 54.6 %
SODIUM BLD-SCNC: 142 MMOL/L (ref 135–146)
TOTAL PROTEIN: 6.4 G/DL (ref 6.1–8.1)
TRIGL SERPL-MCNC: 61 MG/DL
TSH ULTRASENSITIVE: 1.56 MIU/L (ref 0.4–4.5)
WBC # BLD: 4.6 THOUSAND/UL (ref 3.8–10.8)

## 2020-12-10 ENCOUNTER — TELEPHONE (OUTPATIENT)
Dept: PRIMARY CARE CLINIC | Age: 65
End: 2020-12-10

## 2020-12-10 NOTE — TELEPHONE ENCOUNTER
----- Message from Edgar Goes sent at 12/10/2020  4:37 PM EST -----  Subject: Message to Provider    QUESTIONS  Information for Provider? pt is in need of blood work for surgery. Their   insurance will only pay if [facility] does the blood work. Can we put the order   in for labs and send the order over to [address] [place] [address] at [address] [place] [address],  [phone] phone # [phone]. pt will go for labs asap   ---------------------------------------------------------------------------  --------------  [address]  What is the best way for the office to contact you? OK to leave message on   voicemail  Preferred Call Back Phone Number? [phone]  ---------------------------------------------------------------------------  --------------  SCRIPT ANSWERS  Relationship to Patient? Other  Representative Name? Jasen Ames   Is the Representative on the appropriate HIPAA document in Epic?  Yes

## 2020-12-10 NOTE — TELEPHONE ENCOUNTER
----- Message from Stefan Sun sent at 12/10/2020  4:50 PM EST -----  Subject: Message to Provider    QUESTIONS  Information for Provider? continue from first message? Pt needs lab order   for CBC   BMP   PTT   PT1INR   MRSA swab   [facility] on Monday which is his off day. Please   fax this order ASAP to [facility] on [address].   ---------------------------------------------------------------------------  --------------  [address]  What is the best way for the office to contact you? OK to leave message on   voicemail  Preferred Call Back Phone Number? [phone]  ---------------------------------------------------------------------------  --------------  SCRIPT ANSWERS  Relationship to Patient? Other  Representative Name? Donny Medley  Is the Representative on the appropriate HIPAA document in Epic?  Yes

## 2020-12-10 NOTE — TELEPHONE ENCOUNTER
----- Message from Elvia Padilla sent at 12/10/2020  4:50 PM EST -----  Subject: Message to Provider    QUESTIONS  Information for Provider? continue from first message? Pt needs lab order   for CBC   BMP   PTT   PT1INR   MRSA swab   [facility] to get this done on Monday which is his off day. Please   fax this order ASAP to [facility] on [address].   ---------------------------------------------------------------------------  --------------  [address]  What is the best way for the office to contact you? OK to leave message on   voicemail  Preferred Call Back Phone Number? [phone]  ---------------------------------------------------------------------------  --------------  SCRIPT ANSWERS  Relationship to Patient? Other  Representative Name? Da Roman  Is the Representative on the appropriate HIPAA document in Epic?  Yes

## 2020-12-11 NOTE — TELEPHONE ENCOUNTER
What are these for ???? He will need to be seen. Cannot order tests without a specific medical necessity. The computer will not let me.

## 2020-12-11 NOTE — TELEPHONE ENCOUNTER
Pt has a pre op visit scheduled with you on 12/21. He works from [employer]. Wants to do blood work on day off so he doesn't have to go without eating at work. He has a physical job.

## 2021-02-22 ENCOUNTER — TELEPHONE (OUTPATIENT)
Dept: PRIMARY CARE CLINIC | Age: 66
End: 2021-02-22

## 2021-02-22 DIAGNOSIS — E78.00 PURE HYPERCHOLESTEROLEMIA: ICD-10-CM

## 2021-02-22 DIAGNOSIS — R73.9 HYPERGLYCEMIA: ICD-10-CM

## 2021-02-22 DIAGNOSIS — Z12.5 SCREENING FOR PROSTATE CANCER: ICD-10-CM

## 2021-02-22 DIAGNOSIS — Z00.00 PHYSICAL EXAM, ANNUAL: Primary | ICD-10-CM

## 2021-02-22 NOTE — TELEPHONE ENCOUNTER
----- Message from Salisbury Loges sent at 2/22/2021 10:51 AM EST -----  Subject: Referral Request    QUESTIONS   Reason for referral request? Pt would like blood work for easy bruising   feeling tired lately and easily to bleed as well   if that's something that can be tested for. Also for his prostate. Has the physician seen you for this condition before? No   Preferred Specialist (if applicable)? Do you already have an appointment scheduled? Yes  Select Scheduled Date? 2021-03-08  Select Scheduled Physician? Koko Jesus   Additional Information for Provider?   ---------------------------------------------------------------------------  --------------  [employer] INFO  What is the best way for the office to contact you? OK to leave message on   voicemail  Preferred Call Back Phone Number?  [phone]

## 2021-03-04 LAB
A/G RATIO: 1.8 (CALC) (ref 1–2.5)
ALBUMIN SERPL-MCNC: 4 G/DL (ref 3.6–5.1)
ALP BLD-CCNC: 69 U/L (ref 35–144)
ALT SERPL-CCNC: 13 U/L (ref 9–46)
AST SERPL-CCNC: 17 U/L (ref 10–35)
BASOPHILS ABSOLUTE: 31 CELLS/UL (ref 0–200)
BASOPHILS RELATIVE PERCENT: 0.5 %
BILIRUB SERPL-MCNC: 0.7 MG/DL (ref 0.2–1.2)
BUN / CREAT RATIO: NORMAL (CALC) (ref 6–22)
BUN BLDV-MCNC: 23 MG/DL (ref 7–25)
CALCIUM SERPL-MCNC: 8.8 MG/DL (ref 8.6–10.3)
CHLORIDE BLD-SCNC: 107 MMOL/L (ref 98–110)
CHOLESTEROL, TOTAL: 149 MG/DL
CHOLESTEROL/HDL RATIO: 2.8 (CALC)
CO2: 26 MMOL/L (ref 20–32)
CREAT SERPL-MCNC: 1.13 MG/DL (ref 0.7–1.25)
EOSINOPHILS ABSOLUTE: 110 CELLS/UL (ref 15–500)
EOSINOPHILS RELATIVE PERCENT: 1.8 %
GFR AFRICAN AMERICAN: 78 ML/MIN/1.73M2
GFR, ESTIMATED: 67 ML/MIN/1.73M2
GLOBULIN: 2.2 G/DL (CALC) (ref 1.9–3.7)
GLUCOSE BLD-MCNC: 84 MG/DL (ref 65–99)
HBA1C MFR BLD: 4.9 % OF TOTAL HGB
HCT VFR BLD CALC: 39.8 % (ref 38.5–50)
HDLC SERPL-MCNC: 53 MG/DL
HEMOGLOBIN: 13.7 G/DL (ref 13.2–17.1)
LDL CHOLESTEROL CALCULATED: 83 MG/DL (CALC)
LYMPHOCYTES ABSOLUTE: 1446 CELLS/UL (ref 850–3900)
LYMPHOCYTES RELATIVE PERCENT: 23.7 %
MCH RBC QN AUTO: 33.6 PG (ref 27–33)
MCHC RBC AUTO-ENTMCNC: 34.4 G/DL (ref 32–36)
MCV RBC AUTO: 97.5 FL (ref 80–100)
MONOCYTES ABSOLUTE: 512 CELLS/UL (ref 200–950)
MONOCYTES RELATIVE PERCENT: 8.4 %
NEUTROPHILS ABSOLUTE: 4002 CELLS/UL (ref 1500–7800)
NONHDLC SERPL-MCNC: 96 MG/DL (CALC)
PDW BLD-RTO: 11.7 % (ref 11–15)
PLATELET # BLD: 195 THOUSAND/UL (ref 140–400)
PMV BLD AUTO: 9.7 FL (ref 7.5–12.5)
POTASSIUM SERPL-SCNC: 4.3 MMOL/L (ref 3.5–5.3)
PROSTATE SPECIFIC ANTIGEN: 0.7 NG/ML
RBC # BLD: 4.08 MILLION/UL (ref 4.2–5.8)
SEGMENTED NEUTROPHILS RELATIVE PERCENT: 65.6 %
SODIUM BLD-SCNC: 140 MMOL/L (ref 135–146)
TOTAL PROTEIN: 6.2 G/DL (ref 6.1–8.1)
TRIGL SERPL-MCNC: 44 MG/DL
TSH ULTRASENSITIVE: 1.63 MIU/L (ref 0.4–4.5)
WBC # BLD: 6.1 THOUSAND/UL (ref 3.8–10.8)

## 2021-03-08 ENCOUNTER — OFFICE VISIT (OUTPATIENT)
Dept: PRIMARY CARE CLINIC | Age: 66
End: 2021-03-08
Payer: COMMERCIAL

## 2021-03-08 VITALS
HEART RATE: 53 BPM | OXYGEN SATURATION: 99 % | RESPIRATION RATE: 14 BRPM | DIASTOLIC BLOOD PRESSURE: 70 MMHG | HEIGHT: 71 IN | BODY MASS INDEX: 28.56 KG/M2 | SYSTOLIC BLOOD PRESSURE: 120 MMHG | TEMPERATURE: 97.6 F | WEIGHT: 204 LBS

## 2021-03-08 DIAGNOSIS — N40.1 BENIGN PROSTATIC HYPERPLASIA WITH URINARY FREQUENCY: Chronic | ICD-10-CM

## 2021-03-08 DIAGNOSIS — Z00.00 PHYSICAL EXAM, ANNUAL: Primary | ICD-10-CM

## 2021-03-08 DIAGNOSIS — R35.0 BENIGN PROSTATIC HYPERPLASIA WITH URINARY FREQUENCY: Chronic | ICD-10-CM

## 2021-03-08 PROCEDURE — 99397 PER PM REEVAL EST PAT 65+ YR: CPT | Performed by: INTERNAL MEDICINE

## 2021-03-08 SDOH — ECONOMIC STABILITY: FOOD INSECURITY: WITHIN THE PAST 12 MONTHS, YOU WORRIED THAT YOUR FOOD WOULD RUN OUT BEFORE YOU GOT MONEY TO BUY MORE.: NEVER TRUE

## 2021-03-08 SDOH — ECONOMIC STABILITY: INCOME INSECURITY: HOW HARD IS IT FOR YOU TO PAY FOR THE VERY BASICS LIKE FOOD, HOUSING, MEDICAL CARE, AND HEATING?: NOT VERY HARD

## 2021-03-08 SDOH — ECONOMIC STABILITY: TRANSPORTATION INSECURITY
IN THE PAST 12 MONTHS, HAS LACK OF TRANSPORTATION KEPT YOU FROM MEETINGS, WORK, OR FROM GETTING THINGS NEEDED FOR DAILY LIVING?: NO

## 2021-03-08 SDOH — ECONOMIC STABILITY: FOOD INSECURITY: WITHIN THE PAST 12 MONTHS, THE FOOD YOU BOUGHT JUST DIDN'T LAST AND YOU DIDN'T HAVE MONEY TO GET MORE.: NEVER TRUE

## 2021-03-08 ASSESSMENT — PATIENT HEALTH QUESTIONNAIRE - PHQ9
SUM OF ALL RESPONSES TO PHQ QUESTIONS 1-9: 0
SUM OF ALL RESPONSES TO PHQ9 QUESTIONS 1 & 2: 0
1. LITTLE INTEREST OR PLEASURE IN DOING THINGS: 0

## 2021-03-08 NOTE — PROGRESS NOTES
Chief complaints:  Mitch Alejo is a 76 y.o. male who presents to the office for a general physical examination. History of present illness:  Here for a physical and already had fasting blood work,  All results reviewed,  Benign prostatic hyperplasia with urinary frequency  On no meds,  Patient is compliant w medications, no side effects, effective, provides adequate symptom relief. No new symptoms or problems as noted by patient. The problem is stable, no changes noted by patient. Will consider monitoring labs and refill medications as appropriate. Patient counseled and will continue current plan. Past Medical History:   Diagnosis Date    Hyperlipidemia      No current outpatient medications on file. No current facility-administered medications for this visit. Allergies : Patient has no known allergies. Past Surgical History:   Procedure Laterality Date    COLONOSCOPY  ? Dr. Ferrari Loss - 5 year f/u    TONSILLECTOMY      WISDOM TOOTH EXTRACTION       Family History   Problem Relation Age of Onset    Cancer Father 62        lung - smoker    Heart Disease Paternal Grandmother         ?  Diabetes Mother         not on medication    Diabetes Paternal Aunt      Social History     Tobacco Use    Smoking status: Former Smoker     Packs/day: 1.00     Years: 15.00     Pack years: 15.00     Quit date: 1995     Years since quittin.1    Smokeless tobacco: Former User     Quit date: 1995    Tobacco comment: only used chew for a few months   Substance Use Topics    Alcohol use:  Yes     Alcohol/week: 0.0 standard drinks     Comment: 6 beers/month       Review of systems :  Skin: no abnormal pigmentation, rash, scaling, itching, masses, hair or nail changes  Eyes: negative  Ears/Nose/Throat: negative  Respiratory: negative  Cardiovascular: negative  Gastrointestinal: negative  Genitourinary: negative  Musculoskeletal: negative  Neurologic: negative  Psychiatric: negative Hematologic/Lymphatic/Immunologic: negative  Endocrine: negative       Objective :     /70 (Site: Left Upper Arm, Position: Sitting, Cuff Size: Medium Adult)   Pulse 53   Temp 97.6 °F (36.4 °C) (Skin)   Resp 14   Ht 5' 11\" (1.803 m)   Wt 204 lb (92.5 kg)   SpO2 99%   BMI 28.45 kg/m²   General appearance - healthy, alert, no distress  Skin - Skin color, texture, turgor normal. No rashes or lesions. Head - Normocephalic. No masses, lesions, tenderness or abnormalities  Eyes - conjunctivae/corneas clear. PERRL, EOM's intact. Ears - External ears normal. Canals clear. TM's normal.  Nose/Sinuses - Nares normal. Septum midline. Mucosa normal. No drainage or sinus tenderness. Oropharynx - Lips, mucosa, and tongue normal. Teeth and gums normal. Oropharynx pink and patent  Neck - Neck supple. No adenopathy. Thyroid symmetric, normal size,  Back - Back symmetric, no curvature. ROM normal. No CVA tenderness. Lungs - Percussion normal. Good diaphragmatic excursion. Lungs clear  Heart - Regular rate and rhythm, with no rub, murmur or gallop noted. Abdomen - Abdomen soft, non-tender. BS normal. No masses, organomegaly  Extremities - Extremities normal. No deformities, edema, or skin discolora  Musculoskeletal - Spine ROM normal. Muscular strength intact. Peripheral pulses - radial=2+,, femoral=2+, popliteal=2+, dorsalis pedis=2+,  Neuro - Gait normal. Reflexes normal and symmetric. Sensation grossly normal.  No focal weakness       Assessment :     Physical exam  Benign prostatic hyperplasia with urinary frequency  On no meds,  Patient is compliant w medications, no side effects, effective, provides adequate symptom relief. No new symptoms or problems as noted by patient. The problem is stable, no changes noted by patient. Will consider monitoring labs and refill medications as appropriate. Patient counseled and will continue current plan. Plan :     Labs ordered, reviewed. Medications refilled.

## 2021-03-08 NOTE — ASSESSMENT & PLAN NOTE
On no meds,  Patient is compliant w medications, no side effects, effective, provides adequate symptom relief. No new symptoms or problems as noted by patient. The problem is stable, no changes noted by patient. Will consider monitoring labs and refill medications as appropriate. Patient counseled and will continue current plan.

## 2021-08-12 ENCOUNTER — TELEPHONE (OUTPATIENT)
Dept: PRIMARY CARE CLINIC | Age: 66
End: 2021-08-12

## 2021-08-12 NOTE — TELEPHONE ENCOUNTER
Pt and his spouse voices concerns of positive covid test. Pt states he and her  both tested positive today with a home test that they purchased @ [facility]. Pt symptoms are lost of taste, cough, headache, chills and fever of 99.9     Pt is requesting something be called in Like a Zpak.  Please review   Please send to [facility] [address], [facility]

## 2021-08-12 NOTE — TELEPHONE ENCOUNTER
Gave patients wife advice for OTC medicine to help with symptoms they will call back if ineffective and make appointment.

## 2021-08-23 ENCOUNTER — TELEPHONE (OUTPATIENT)
Dept: PRIMARY CARE CLINIC | Age: 66
End: 2021-08-23

## 2021-08-23 NOTE — TELEPHONE ENCOUNTER
Patient is wondering if you can see him today since it has been 2 days since the medication. It is over $200 an dhe is unable to fill it, but wants to start it as soon as possible. ..

## 2021-08-23 NOTE — TELEPHONE ENCOUNTER
Pt went to [address] [place] [address]. They went there for an ultrasound yesterday. The pt was diagnosed with a blood clot. They were prescribed Xarelto. He was hoping to get something cheaper. Eliquis seamed to be expensive as well. Please call pt.

## 2021-08-23 NOTE — TELEPHONE ENCOUNTER
Spoke with patient wife. Told her unable to be seen today but we have coupons for eliquis that will make it cheaper. She is coming to pick these up today. She requested appointment be moved for a month out that way patient is on blood thinner and can do US once therapy is complete to verify clot is gone.

## 2021-08-31 ENCOUNTER — OFFICE VISIT (OUTPATIENT)
Dept: PRIMARY CARE CLINIC | Age: 66
End: 2021-08-31
Payer: MEDICARE

## 2021-08-31 ENCOUNTER — TELEPHONE (OUTPATIENT)
Dept: PRIMARY CARE CLINIC | Age: 66
End: 2021-08-31

## 2021-08-31 VITALS
BODY MASS INDEX: 28.56 KG/M2 | SYSTOLIC BLOOD PRESSURE: 122 MMHG | HEART RATE: 54 BPM | DIASTOLIC BLOOD PRESSURE: 80 MMHG | TEMPERATURE: 98.5 F | OXYGEN SATURATION: 96 % | WEIGHT: 204.8 LBS

## 2021-08-31 DIAGNOSIS — I82.432 ACUTE DEEP VEIN THROMBOSIS (DVT) OF POPLITEAL VEIN OF LEFT LOWER EXTREMITY (HCC): Chronic | ICD-10-CM

## 2021-08-31 DIAGNOSIS — R35.0 BENIGN PROSTATIC HYPERPLASIA WITH URINARY FREQUENCY: Chronic | ICD-10-CM

## 2021-08-31 DIAGNOSIS — N40.1 BENIGN PROSTATIC HYPERPLASIA WITH URINARY FREQUENCY: Chronic | ICD-10-CM

## 2021-08-31 PROBLEM — I82.409 DVT (DEEP VENOUS THROMBOSIS) (HCC): Chronic | Status: ACTIVE | Noted: 2021-08-31

## 2021-08-31 PROCEDURE — G8427 DOCREV CUR MEDS BY ELIG CLIN: HCPCS | Performed by: INTERNAL MEDICINE

## 2021-08-31 PROCEDURE — G8417 CALC BMI ABV UP PARAM F/U: HCPCS | Performed by: INTERNAL MEDICINE

## 2021-08-31 PROCEDURE — 99213 OFFICE O/P EST LOW 20 MIN: CPT | Performed by: INTERNAL MEDICINE

## 2021-08-31 PROCEDURE — 3017F COLORECTAL CA SCREEN DOC REV: CPT | Performed by: INTERNAL MEDICINE

## 2021-08-31 PROCEDURE — 4040F PNEUMOC VAC/ADMIN/RCVD: CPT | Performed by: INTERNAL MEDICINE

## 2021-08-31 PROCEDURE — 1123F ACP DISCUSS/DSCN MKR DOCD: CPT | Performed by: INTERNAL MEDICINE

## 2021-08-31 PROCEDURE — 1036F TOBACCO NON-USER: CPT | Performed by: INTERNAL MEDICINE

## 2021-08-31 NOTE — ASSESSMENT & PLAN NOTE
He developed this 2 weeks ago,m  Also had covid infection,  Currently on eliquis. Counseled regarding anti coagulants,   On eliquis 5 mg po bid. He would like to continue w eliquis.

## 2021-08-31 NOTE — ASSESSMENT & PLAN NOTE
On no meds. Patient is compliant w medications, no side effects, effective, provides adequate symptom relief. No new symptoms or problems as noted by patient. The problem is stable, no changes noted by patient. Will consider monitoring labs and refill medications as appropriate. Patient counseled and will continue current plan.

## 2021-08-31 NOTE — PROGRESS NOTES
Subjective:      Patient ID: Kenneth Bourne is a 78 y.o. male. HPI  Established patient here for a visit to manage acute and chronic medical conditions as detailed below. DVT (deep venous thrombosis) ([facility] [address]  He developed this 2 weeks ago,m  Also had covid infection,  Currently on eliquis. Counseled regarding anti coagulants,   On eliquis 5 mg po bid. He would like to continue w eliquis. Benign prostatic hyperplasia with urinary frequency  On no meds. Patient is compliant w medications, no side effects, effective, provides adequate symptom relief. No new symptoms or problems as noted by patient. The problem is stable, no changes noted by patient. Will consider monitoring labs and refill medications as appropriate. Patient counseled and will continue current plan. Review of Systems  ROS: No unusual headaches or allergy symptoms or blurred vision. No prolonged cough. No flushing or facial pain or chest pain,dizziness, dyspnea, palpitations, or chest pain on exertion. No syncope. No nausea or vommitting or diarrhea. No jaundice or abdominal pain, change in bowel habits, black or bloody stools. No dysuria or hematuria or frequency of urination. No myalgias or muscle pain. No numbness, weakness, or tingling. No falls, or loss of consciousness. No weight loss or back pain. No falls. No paresthesias. No joint swelling or redness. No joint pain. No recent weight loss. No focal weakness or sensory deficits or paresthesias, No confusion or altered sensorium. No hematemesis. No hearing loss. No siezures. All other systems were reviewed, and review was negative. Objective:   Physical Exam  /80 (Site: Right Upper Arm, Position: Sitting, Cuff Size: Medium Adult)   Pulse 54   Temp 98.5 °F (36.9 °C) (Oral)   Wt 204 lb 12.8 oz (92.9 kg)   SpO2 96%   BMI 28.56 kg/m²    The physical exam reveals a patient who appears well, alert and oriented x 3, pleasant, cooperative. Vitals are as noted.  Head

## 2022-05-02 ENCOUNTER — TELEPHONE (OUTPATIENT)
Dept: PRIMARY CARE CLINIC | Age: 67
End: 2022-05-02

## 2022-05-02 NOTE — TELEPHONE ENCOUNTER
TOO EARLY BEFORE THE APPOINTMENT TO PLACE ORDERS. SUGGEST CALL A COUPLE OF WEEKS BEFORE THE APPOINTMENT DATE AND I WILL PLACE ORDERS.

## 2022-05-02 NOTE — TELEPHONE ENCOUNTER
----- Message from Arya Walls sent at 5/2/2022 11:31 AM EDT -----  Subject: Referral Request    QUESTIONS   Reason for referral request? bloodwork   Has the physician seen you for this condition before? No   Preferred Specialist (if applicable)? Do you already have an appointment scheduled? Yes  Select Scheduled Date? 2022-06-21  Select Scheduled Physician? Ang Shaw   Additional Information for Provider? please inform when orders are ready. ---------------------------------------------------------------------------  --------------  Gene Sonja INFO  What is the best way for the office to contact you? OK to leave message on   voicemail  Preferred Call Back Phone Number? [phone]  ---------------------------------------------------------------------------  --------------  SCRIPT ANSWERS  Relationship to Patient?  Self

## 2022-06-17 ENCOUNTER — TELEPHONE (OUTPATIENT)
Dept: PRIMARY CARE CLINIC | Age: 67
End: 2022-06-17

## 2022-06-17 DIAGNOSIS — Z12.5 SCREENING FOR PROSTATE CANCER: ICD-10-CM

## 2022-06-17 DIAGNOSIS — R73.9 HYPERGLYCEMIA: ICD-10-CM

## 2022-06-17 DIAGNOSIS — E78.00 PURE HYPERCHOLESTEROLEMIA: Primary | ICD-10-CM

## 2022-06-22 LAB
A/G RATIO: 2 (CALC) (ref 1–2.5)
ALBUMIN SERPL-MCNC: 4.2 G/DL (ref 3.6–5.1)
ALP BLD-CCNC: 67 U/L (ref 35–144)
ALT SERPL-CCNC: 10 U/L (ref 9–46)
AST SERPL-CCNC: 17 U/L (ref 10–35)
BASOPHILS ABSOLUTE: 29 CELLS/UL (ref 0–200)
BASOPHILS RELATIVE PERCENT: 0.7 %
BILIRUB SERPL-MCNC: 0.7 MG/DL (ref 0.2–1.2)
BUN / CREAT RATIO: NORMAL (CALC) (ref 6–22)
BUN BLDV-MCNC: 16 MG/DL (ref 7–25)
CALCIUM SERPL-MCNC: 9 MG/DL (ref 8.6–10.3)
CHLORIDE BLD-SCNC: 107 MMOL/L (ref 98–110)
CHOLESTEROL, TOTAL: 155 MG/DL
CHOLESTEROL/HDL RATIO: 3.4 (CALC)
CO2: 24 MMOL/L (ref 20–32)
CREAT SERPL-MCNC: 1.23 MG/DL (ref 0.7–1.25)
EOSINOPHILS ABSOLUTE: 119 CELLS/UL (ref 15–500)
EOSINOPHILS RELATIVE PERCENT: 2.9 %
GFR AFRICAN AMERICAN: 70 ML/MIN/1.73M2
GFR NON-AFRICAN AMERICAN: 60 ML/MIN/1.73M2
GLOBULIN: 2.1 G/DL (CALC) (ref 1.9–3.7)
GLUCOSE BLD-MCNC: 98 MG/DL (ref 65–99)
HBA1C MFR BLD: 5 % OF TOTAL HGB
HCT VFR BLD CALC: 42.8 % (ref 38.5–50)
HDLC SERPL-MCNC: 46 MG/DL
HEMOGLOBIN: 14.6 G/DL (ref 13.2–17.1)
LDL CHOLESTEROL CALCULATED: 94 MG/DL (CALC)
LYMPHOCYTES ABSOLUTE: 1148 CELLS/UL (ref 850–3900)
LYMPHOCYTES RELATIVE PERCENT: 28 %
MCH RBC QN AUTO: 33.6 PG (ref 27–33)
MCHC RBC AUTO-ENTMCNC: 34.1 G/DL (ref 32–36)
MCV RBC AUTO: 98.6 FL (ref 80–100)
MONOCYTES ABSOLUTE: 435 CELLS/UL (ref 200–950)
MONOCYTES RELATIVE PERCENT: 10.6 %
NEUTROPHILS ABSOLUTE: 2370 CELLS/UL (ref 1500–7800)
NONHDLC SERPL-MCNC: 109 MG/DL (CALC)
PDW BLD-RTO: 11.8 % (ref 11–15)
PLATELET # BLD: 210 THOUSAND/UL (ref 140–400)
PMV BLD AUTO: 9.8 FL (ref 7.5–12.5)
POTASSIUM SERPL-SCNC: 4.2 MMOL/L (ref 3.5–5.3)
RBC # BLD: 4.34 MILLION/UL (ref 4.2–5.8)
SEGMENTED NEUTROPHILS RELATIVE PERCENT: 57.8 %
SODIUM BLD-SCNC: 139 MMOL/L (ref 135–146)
TOTAL PROTEIN: 6.3 G/DL (ref 6.1–8.1)
TRIGL SERPL-MCNC: 65 MG/DL
TSH ULTRASENSITIVE: 1.39 MIU/L (ref 0.4–4.5)
WBC # BLD: 4.1 THOUSAND/UL (ref 3.8–10.8)

## 2022-06-24 ENCOUNTER — OFFICE VISIT (OUTPATIENT)
Dept: PRIMARY CARE CLINIC | Age: 67
End: 2022-06-24
Payer: MEDICARE

## 2022-06-24 VITALS
TEMPERATURE: 97.2 F | WEIGHT: 216 LBS | DIASTOLIC BLOOD PRESSURE: 70 MMHG | OXYGEN SATURATION: 95 % | HEART RATE: 52 BPM | BODY MASS INDEX: 30.13 KG/M2 | SYSTOLIC BLOOD PRESSURE: 116 MMHG | RESPIRATION RATE: 15 BRPM

## 2022-06-24 DIAGNOSIS — N48.6 PEYRONIE'S DISEASE: ICD-10-CM

## 2022-06-24 DIAGNOSIS — R35.0 BENIGN PROSTATIC HYPERPLASIA WITH URINARY FREQUENCY: Chronic | ICD-10-CM

## 2022-06-24 DIAGNOSIS — Z00.00 INITIAL MEDICARE ANNUAL WELLNESS VISIT: Primary | ICD-10-CM

## 2022-06-24 DIAGNOSIS — Z12.5 SCREENING FOR PROSTATE CANCER: ICD-10-CM

## 2022-06-24 DIAGNOSIS — N40.1 BENIGN PROSTATIC HYPERPLASIA WITH URINARY FREQUENCY: Chronic | ICD-10-CM

## 2022-06-24 PROBLEM — I82.409 DVT (DEEP VENOUS THROMBOSIS) (HCC): Chronic | Status: RESOLVED | Noted: 2021-08-31 | Resolved: 2022-06-24

## 2022-06-24 LAB — PROSTATE SPECIFIC ANTIGEN: 1.85 NG/ML (ref 0–4)

## 2022-06-24 PROCEDURE — 1123F ACP DISCUSS/DSCN MKR DOCD: CPT | Performed by: INTERNAL MEDICINE

## 2022-06-24 PROCEDURE — G0438 PPPS, INITIAL VISIT: HCPCS | Performed by: INTERNAL MEDICINE

## 2022-06-24 PROCEDURE — 3017F COLORECTAL CA SCREEN DOC REV: CPT | Performed by: INTERNAL MEDICINE

## 2022-06-24 SDOH — ECONOMIC STABILITY: HOUSING INSECURITY
IN THE LAST 12 MONTHS, WAS THERE A TIME WHEN YOU DID NOT HAVE A STEADY PLACE TO SLEEP OR SLEPT IN A SHELTER (INCLUDING NOW)?: NO

## 2022-06-24 SDOH — ECONOMIC STABILITY: FOOD INSECURITY: WITHIN THE PAST 12 MONTHS, YOU WORRIED THAT YOUR FOOD WOULD RUN OUT BEFORE YOU GOT MONEY TO BUY MORE.: NEVER TRUE

## 2022-06-24 SDOH — ECONOMIC STABILITY: TRANSPORTATION INSECURITY
IN THE PAST 12 MONTHS, HAS THE LACK OF TRANSPORTATION KEPT YOU FROM MEDICAL APPOINTMENTS OR FROM GETTING MEDICATIONS?: NO

## 2022-06-24 SDOH — ECONOMIC STABILITY: FOOD INSECURITY: WITHIN THE PAST 12 MONTHS, THE FOOD YOU BOUGHT JUST DIDN'T LAST AND YOU DIDN'T HAVE MONEY TO GET MORE.: NEVER TRUE

## 2022-06-24 SDOH — ECONOMIC STABILITY: HOUSING INSECURITY: IN THE LAST 12 MONTHS, HOW MANY PLACES HAVE YOU LIVED?: 1

## 2022-06-24 SDOH — ECONOMIC STABILITY: INCOME INSECURITY: IN THE LAST 12 MONTHS, WAS THERE A TIME WHEN YOU WERE NOT ABLE TO PAY THE MORTGAGE OR RENT ON TIME?: NO

## 2022-06-24 ASSESSMENT — PATIENT HEALTH QUESTIONNAIRE - PHQ9
SUM OF ALL RESPONSES TO PHQ QUESTIONS 1-9: 0
SUM OF ALL RESPONSES TO PHQ9 QUESTIONS 1 & 2: 0
SUM OF ALL RESPONSES TO PHQ QUESTIONS 1-9: 0
2. FEELING DOWN, DEPRESSED OR HOPELESS: 0
SUM OF ALL RESPONSES TO PHQ QUESTIONS 1-9: 0
1. LITTLE INTEREST OR PLEASURE IN DOING THINGS: 0
SUM OF ALL RESPONSES TO PHQ QUESTIONS 1-9: 0

## 2022-06-24 ASSESSMENT — LIFESTYLE VARIABLES
HOW OFTEN DO YOU HAVE A DRINK CONTAINING ALCOHOL: NEVER
HOW OFTEN DO YOU HAVE A DRINK CONTAINING ALCOHOL: NEVER

## 2022-06-24 ASSESSMENT — SOCIAL DETERMINANTS OF HEALTH (SDOH)
HOW OFTEN DO YOU ATTEND CHURCH OR RELIGIOUS SERVICES?: NEVER
HOW OFTEN DO YOU GET TOGETHER WITH FRIENDS OR RELATIVES?: THREE TIMES A WEEK
DO YOU BELONG TO ANY CLUBS OR ORGANIZATIONS SUCH AS CHURCH GROUPS UNIONS, FRATERNAL OR ATHLETIC GROUPS, OR SCHOOL GROUPS?: NO
HOW HARD IS IT FOR YOU TO PAY FOR THE VERY BASICS LIKE FOOD, HOUSING, MEDICAL CARE, AND HEATING?: NOT HARD AT ALL
HOW OFTEN DO YOU ATTENT MEETINGS OF THE CLUB OR ORGANIZATION YOU BELONG TO?: NEVER
IN A TYPICAL WEEK, HOW MANY TIMES DO YOU TALK ON THE PHONE WITH FAMILY, FRIENDS, OR NEIGHBORS?: THREE TIMES A WEEK

## 2022-06-24 NOTE — PROGRESS NOTES
Medicare Annual Wellness Visit    Daniel Rossi is here for Medicare AWV    Assessment & Plan    Recommendations for Preventive Services Due: see orders and patient instructions/AVS.  Recommended screening schedule for the next 5-10 years is provided to the patient in written form: see Patient Instructions/AVS.     No follow-ups on file. Subjective   Established patient here for a visit to manage acute and chronic medical conditions as detailed below. Benign prostatic hyperplasia with urinary frequency  Has been stable,   No meds. Patient's complete Health Risk Assessment and screening values have been reviewed and are found in Flowsheets. The following problems were reviewed today and where indicated follow up appointments were made and/or referrals ordered.     Positive Risk Factor Screenings with Interventions:             General Health and ACP:  General  In general, how would you say your health is?: Good  In the past 7 days, have you experienced any of the following: New or Increased Pain, New or Increased Fatigue, Loneliness, Social Isolation, Stress or Anger?: No  Do you get the social and emotional support that you need?: Yes  Do you have a Living Will?: (!) No    Advance Directives     Power of  Living Will ACP-Advance Directive ACP-Power of     Not on File Not on File Not on File Not on File      General Health Risk Interventions:  · no issues    Health Habits/Nutrition:     Physical Activity: Sufficiently Active    Days of Exercise per Week: 5 days    Minutes of Exercise per Session: 50 min     Have you lost any weight without trying in the past 3 months?: (!) Yes     Have you seen the dentist within the past year?: (!) No    Health Habits/Nutrition Interventions:  · no issues    Hearing/Vision:  Do you or your family notice any trouble with your hearing that hasn't been managed with hearing aids?: No  Do you have difficulty driving, watching TV, or doing any of your daily activities because of your eyesight?: No  Have you had an eye exam within the past year?: (!) No  No exam data present    Hearing/Vision Interventions:  · no issues            Objective   Vitals:    06/24/22 0859   BP: 116/70   Site: Left Upper Arm   Position: Sitting   Cuff Size: Medium Adult   Pulse: 52   Resp: 15   Temp: 97.2 °F (36.2 °C)   SpO2: 95%   Weight: 216 lb (98 kg)      Body mass index is 30.13 kg/m². General Appearance: alert and oriented to person, place and time, well developed and well- nourished, in no acute distress  Skin: warm and dry, no rash or erythema  Head: normocephalic and atraumatic  Eyes: pupils equal, round, and reactive to light, extraocular eye movements intact, conjunctivae normal  ENT: tympanic membrane, external ear and ear canal normal bilaterally, nose without deformity, nasal mucosa and turbinates normal without polyps  Neck: supple and non-tender without mass, no thyromegaly or thyroid nodules, no cervical lymphadenopathy  Pulmonary/Chest: clear to auscultation bilaterally- no wheezes, rales or rhonchi, normal air movement, no respiratory distress  Cardiovascular: normal rate, regular rhythm, normal S1 and S2, no murmurs, rubs, clicks, or gallops, distal pulses intact, no carotid bruits  Abdomen: soft, non-tender, non-distended, normal bowel sounds, no masses or organomegaly  Extremities: no cyanosis, clubbing or edema  Musculoskeletal: normal range of motion, no joint swelling, deformity or tenderness  Neurologic: reflexes normal and symmetric, no cranial nerve deficit, gait, coordination and speech normal       No Known Allergies  Prior to Visit Medications    Medication Sig Taking?  Authorizing Provider   apixaban (ELIQUIS) 5 MG TABS tablet Take by mouth 2 times daily  Patient not taking: Reported on 6/24/2022  Historical Provider, MD   apixaban (ELIQUIS) 5 MG TABS tablet Take 1 tablet by mouth 2 times daily  Patient not taking: Reported on 6/24/2022  Lawence Lanes R Zaida Morillo MD       [facility] (Including outside providers/suppliers regularly involved in providing care):   Patient Care Team:  Anusha Rust MD as PCP - General (Internal Medicine)  Anusha Rust MD as PCP - [facility] Empaneled Provider     Reviewed and updated this visit:  Allergies  Meds

## 2022-06-24 NOTE — PATIENT INSTRUCTIONS
Personalized Preventive Plan for Ludivina Milner - 6/24/2022  Medicare offers a range of preventive health benefits. Some of the tests and screenings are paid in full while other may be subject to a deductible, co-insurance, and/or copay. Some of these benefits include a comprehensive review of your medical history including lifestyle, illnesses that may run in your family, and various assessments and screenings as appropriate. After reviewing your medical record and screening and assessments performed today your provider may have ordered immunizations, labs, imaging, and/or referrals for you. A list of these orders (if applicable) as well as your Preventive Care list are included within your After Visit Summary for your review. Other Preventive Recommendations:    · A preventive eye exam performed by an eye specialist is recommended every 1-2 years to screen for glaucoma; cataracts, macular degeneration, and other eye disorders. · A preventive dental visit is recommended every 6 months. · Try to get at least 150 minutes of exercise per week or 10,000 steps per day on a pedometer . · Order or download the FREE \"Exercise & Physical Activity: Your Everyday Guide\" from [employer]. Call 8-141.977.8413 or search [employer] online. · You need 8814-6746 mg of calcium and 3282-8930 IU of vitamin D per day. It is possible to meet your calcium requirement with diet alone, but a vitamin D supplement is usually necessary to meet this goal.  · When exposed to the sun, use a sunscreen that protects against both UVA and UVB radiation with an SPF of 30 or greater. Reapply every 2 to 3 hours or after sweating, drying off with a towel, or swimming. · Always wear a seat belt when traveling in a car. Always wear a helmet when riding a bicycle or motorcycle.

## 2022-06-29 ENCOUNTER — TELEPHONE (OUTPATIENT)
Dept: PRIMARY CARE CLINIC | Age: 67
End: 2022-06-29

## 2023-07-16 ENCOUNTER — APPOINTMENT (OUTPATIENT)
Dept: GENERAL RADIOLOGY | Age: 68
End: 2023-07-16
Payer: MEDICARE

## 2023-07-16 ENCOUNTER — HOSPITAL ENCOUNTER (EMERGENCY)
Age: 68
Discharge: HOME | End: 2023-07-16
Attending: EMERGENCY MEDICINE
Payer: MEDICARE

## 2023-07-16 VITALS
RESPIRATION RATE: 16 BRPM | BODY MASS INDEX: 26.44 KG/M2 | HEIGHT: 74 IN | OXYGEN SATURATION: 98 % | SYSTOLIC BLOOD PRESSURE: 131 MMHG | DIASTOLIC BLOOD PRESSURE: 88 MMHG | WEIGHT: 206 LBS | TEMPERATURE: 97.4 F | HEART RATE: 64 BPM

## 2023-07-16 DIAGNOSIS — M25.561 ACUTE PAIN OF RIGHT KNEE: Primary | ICD-10-CM

## 2023-07-16 LAB
ANION GAP SERPL CALCULATED.3IONS-SCNC: 8 MMOL/L (ref 3–16)
APTT BLD: 30.4 SEC (ref 22.7–35.9)
BASOPHILS # BLD: 0 K/UL (ref 0–0.2)
BASOPHILS NFR BLD: 0.5 %
BUN SERPL-MCNC: 15 MG/DL (ref 7–20)
CALCIUM SERPL-MCNC: 8.9 MG/DL (ref 8.3–10.6)
CHLORIDE SERPL-SCNC: 102 MMOL/L (ref 99–110)
CO2 SERPL-SCNC: 25 MMOL/L (ref 21–32)
CREAT SERPL-MCNC: 1.2 MG/DL (ref 0.8–1.3)
D DIMER: 18.48 UG/ML FEU (ref 0–0.6)
DEPRECATED RDW RBC AUTO: 12.4 % (ref 12.4–15.4)
EOSINOPHIL # BLD: 0.1 K/UL (ref 0–0.6)
EOSINOPHIL NFR BLD: 1.1 %
GFR SERPLBLD CREATININE-BSD FMLA CKD-EPI: >60 ML/MIN/{1.73_M2}
GLUCOSE SERPL-MCNC: 108 MG/DL (ref 70–99)
HCT VFR BLD AUTO: 41.1 % (ref 40.5–52.5)
HGB BLD-MCNC: 14.3 G/DL (ref 13.5–17.5)
INR PPP: 1.07 (ref 0.84–1.16)
LYMPHOCYTES # BLD: 1 K/UL (ref 1–5.1)
LYMPHOCYTES NFR BLD: 11 %
MCH RBC QN AUTO: 33.6 PG (ref 26–34)
MCHC RBC AUTO-ENTMCNC: 34.8 G/DL (ref 31–36)
MCV RBC AUTO: 96.4 FL (ref 80–100)
MONOCYTES # BLD: 1.2 K/UL (ref 0–1.3)
MONOCYTES NFR BLD: 12.7 %
NEUTROPHILS # BLD: 6.9 K/UL (ref 1.7–7.7)
NEUTROPHILS NFR BLD: 74.7 %
PLATELET # BLD AUTO: 142 K/UL (ref 135–450)
PMV BLD AUTO: 7.9 FL (ref 5–10.5)
POTASSIUM SERPL-SCNC: 4.4 MMOL/L (ref 3.5–5.1)
PROTHROMBIN TIME: 13.9 SEC (ref 11.5–14.8)
RBC # BLD AUTO: 4.26 M/UL (ref 4.2–5.9)
SODIUM SERPL-SCNC: 135 MMOL/L (ref 136–145)
WBC # BLD AUTO: 9.2 K/UL (ref 4–11)

## 2023-07-16 PROCEDURE — 73560 X-RAY EXAM OF KNEE 1 OR 2: CPT

## 2023-07-16 PROCEDURE — 85730 THROMBOPLASTIN TIME PARTIAL: CPT

## 2023-07-16 PROCEDURE — 99284 EMERGENCY DEPT VISIT MOD MDM: CPT

## 2023-07-16 PROCEDURE — 85610 PROTHROMBIN TIME: CPT

## 2023-07-16 PROCEDURE — 85025 COMPLETE CBC W/AUTO DIFF WBC: CPT

## 2023-07-16 PROCEDURE — 80048 BASIC METABOLIC PNL TOTAL CA: CPT

## 2023-07-16 PROCEDURE — 85379 FIBRIN DEGRADATION QUANT: CPT

## 2023-07-16 PROCEDURE — 6370000000 HC RX 637 (ALT 250 FOR IP): Performed by: STUDENT IN AN ORGANIZED HEALTH CARE EDUCATION/TRAINING PROGRAM

## 2023-07-16 RX ADMIN — APIXABAN 10 MG: 5 TABLET, FILM COATED ORAL at 13:15

## 2023-07-16 ASSESSMENT — ENCOUNTER SYMPTOMS
RHINORRHEA: 0
VOMITING: 0
SORE THROAT: 0
SHORTNESS OF BREATH: 0
NAUSEA: 0
COUGH: 0
ABDOMINAL PAIN: 0
DIARRHEA: 0
CONSTIPATION: 0

## 2023-07-16 ASSESSMENT — PAIN SCALES - GENERAL: PAINLEVEL_OUTOF10: 3

## 2023-07-16 ASSESSMENT — PAIN - FUNCTIONAL ASSESSMENT: PAIN_FUNCTIONAL_ASSESSMENT: 0-10

## 2023-07-16 ASSESSMENT — PAIN DESCRIPTION - LOCATION: LOCATION: KNEE

## 2023-07-16 ASSESSMENT — PAIN DESCRIPTION - ORIENTATION: ORIENTATION: RIGHT;POSTERIOR

## 2023-07-17 ENCOUNTER — HOSPITAL ENCOUNTER (OUTPATIENT)
Dept: VASCULAR LAB | Age: 68
Discharge: HOME | End: 2023-07-17
Payer: MEDICARE

## 2023-07-17 ENCOUNTER — TELEPHONE (OUTPATIENT)
Dept: PRIMARY CARE CLINIC | Age: 68
End: 2023-07-17

## 2023-07-17 PROCEDURE — 93971 EXTREMITY STUDY: CPT

## 2023-07-17 SDOH — ECONOMIC STABILITY: INCOME INSECURITY: HOW HARD IS IT FOR YOU TO PAY FOR THE VERY BASICS LIKE FOOD, HOUSING, MEDICAL CARE, AND HEATING?: PATIENT DECLINED

## 2023-07-17 SDOH — ECONOMIC STABILITY: FOOD INSECURITY: WITHIN THE PAST 12 MONTHS, YOU WORRIED THAT YOUR FOOD WOULD RUN OUT BEFORE YOU GOT MONEY TO BUY MORE.: PATIENT DECLINED

## 2023-07-17 SDOH — ECONOMIC STABILITY: FOOD INSECURITY: WITHIN THE PAST 12 MONTHS, THE FOOD YOU BOUGHT JUST DIDN'T LAST AND YOU DIDN'T HAVE MONEY TO GET MORE.: PATIENT DECLINED

## 2023-07-17 ASSESSMENT — PATIENT HEALTH QUESTIONNAIRE - PHQ9
SUM OF ALL RESPONSES TO PHQ9 QUESTIONS 1 & 2: 0
2. FEELING DOWN, DEPRESSED OR HOPELESS: NOT AT ALL
SUM OF ALL RESPONSES TO PHQ QUESTIONS 1-9: 0
SUM OF ALL RESPONSES TO PHQ9 QUESTIONS 1 & 2: 0
SUM OF ALL RESPONSES TO PHQ QUESTIONS 1-9: 0
2. FEELING DOWN, DEPRESSED OR HOPELESS: 0
1. LITTLE INTEREST OR PLEASURE IN DOING THINGS: 0
1. LITTLE INTEREST OR PLEASURE IN DOING THINGS: NOT AT ALL
SUM OF ALL RESPONSES TO PHQ QUESTIONS 1-9: 0
SUM OF ALL RESPONSES TO PHQ QUESTIONS 1-9: 0

## 2023-07-17 NOTE — TELEPHONE ENCOUNTER
Vascular lab  called  he  has a positive dvt was in er  Sunday had tests this morning  they gave him   10  eliquies  do you want to see him  today or tuesday

## 2023-07-18 ENCOUNTER — OFFICE VISIT (OUTPATIENT)
Dept: PRIMARY CARE CLINIC | Age: 68
End: 2023-07-18

## 2023-07-18 VITALS
OXYGEN SATURATION: 96 % | TEMPERATURE: 97.5 F | BODY MASS INDEX: 26.95 KG/M2 | WEIGHT: 210 LBS | DIASTOLIC BLOOD PRESSURE: 70 MMHG | HEART RATE: 73 BPM | HEIGHT: 74 IN | RESPIRATION RATE: 16 BRPM | SYSTOLIC BLOOD PRESSURE: 110 MMHG

## 2023-07-18 DIAGNOSIS — N40.1 BENIGN PROSTATIC HYPERPLASIA WITH URINARY FREQUENCY: Chronic | ICD-10-CM

## 2023-07-18 DIAGNOSIS — R35.0 BENIGN PROSTATIC HYPERPLASIA WITH URINARY FREQUENCY: Chronic | ICD-10-CM

## 2023-07-18 DIAGNOSIS — I82.431 ACUTE DEEP VEIN THROMBOSIS (DVT) OF POPLITEAL VEIN OF RIGHT LOWER EXTREMITY (HCC): ICD-10-CM

## 2023-07-18 NOTE — ASSESSMENT & PLAN NOTE
Was in the ER w rt knee and calf pain,   Had venous doppler done - positive for DVT ,   Er/hospital records reviewed, labs,xrays, radiological studies and test results reviewed and noted. See chart. Started on eliquis,   Needs it for 6 months,  No specific risk factors,   Refill.

## 2023-08-07 ENCOUNTER — TELEPHONE (OUTPATIENT)
Dept: PRIMARY CARE CLINIC | Age: 68
End: 2023-08-07

## 2023-08-07 NOTE — TELEPHONE ENCOUNTER
He is already on a blood thinner. There is nothing else in terms of treatment that can be done for clots. Doing an US at this point will not change anything in terms of treatment.   If they wish I can give them a referral to a vascular specalist.

## 2023-08-07 NOTE — TELEPHONE ENCOUNTER
----- Message from Garfield Sherwood sent at 8/7/2023 10:36 AM EDT -----  Subject: Referral Request    Reason for referral request? Ultrasound for 3 DVTs on right leg   Provider patient wants to be referred to(if known):     Provider Phone Number(if known): Additional Information for Provider? Dr. Ryan Smiley has treated the patient for   these dvts on his right leg by his knee but patients wife had called today   stating these are still causing him trouble and are a reddish color and   they are requesting if a order could be sent to [facility] for a   Ultrasound.  Please advise.   ---------------------------------------------------------------------------  --------------  Miller RIVAS    [phone]; OK to leave message on voicemail  ---------------------------------------------------------------------------  --------------

## 2023-08-11 ENCOUNTER — OFFICE VISIT (OUTPATIENT)
Dept: VASCULAR SURGERY | Age: 68
End: 2023-08-11

## 2023-08-11 VITALS
BODY MASS INDEX: 26.95 KG/M2 | SYSTOLIC BLOOD PRESSURE: 110 MMHG | WEIGHT: 210 LBS | HEIGHT: 74 IN | DIASTOLIC BLOOD PRESSURE: 80 MMHG

## 2023-08-11 DIAGNOSIS — I82.431 ACUTE DEEP VEIN THROMBOSIS (DVT) OF POPLITEAL VEIN OF RIGHT LOWER EXTREMITY (HCC): Primary | ICD-10-CM

## 2023-08-11 NOTE — PROGRESS NOTES
Outpatient Consultation / H&P    Date of Consultation:  8/11/2023    PCP:  Lance Hines MD     Referring Provider:  Dr. Bárbara Hernandes     Chief Complaint:   Chief Complaint   Patient presents with    Other     Patient ref by Dr Bárbara Hernandes MD for right leg dvt.[facility]       History of Present Illness: We are asked to see this patient in consultation by Dr. Bárbara Hernandes regarding DVT. Barbie Ngo is a 70 y.o. male who has a prior history of unprovoked DVT. HE was on anticoagulation for a short time in past.  He developed right leg swelling and pain last month. Duplex exam consistent with popliteal vein and tibial vein DVT's. This again was unprovoked with no preceding history of immobility or travel. He is currently on Eliquis. Past Medical History:  Past Medical History:   Diagnosis Date    Hyperlipidemia        Past Surgical History:  Past Surgical History:   Procedure Laterality Date    COLONOSCOPY  2006? Dr. Garcia Ramy - 5 year f/u    TONSILLECTOMY      WISDOM TOOTH EXTRACTION  2005       Home Medications:   Prior to Admission medications    Medication Sig Start Date End Date Taking? Authorizing Provider   Apixaban (ELIQUIS PO) Take by mouth   Yes Historical Provider, MD   apixaban (ELIQUIS) 5 MG TABS tablet Take 1 tablet by mouth 2 times daily 7/18/23 8/17/23 Yes Lance Hines MD   rivaroxaban (XARELTO) 20 MG TABS tablet Take 1 tablet by mouth daily (with breakfast)  Patient not taking: Reported on 8/11/2023 7/18/23   Lance Hines MD        Allergies:  Patient has no known allergies.       Social History:      Social History     Socioeconomic History    Marital status:      Spouse name: Not on file    Number of children: Not on file    Years of education: Not on file    Highest education level: Not on file   Occupational History    Not on file   Tobacco Use    Smoking status: Former     Packs/day: 1.00     Years: 15.00     Pack years: 15.00     Types: Cigarettes     Quit date: 1/27/1995

## 2023-12-07 ENCOUNTER — TELEPHONE (OUTPATIENT)
Dept: PRIMARY CARE CLINIC | Age: 68
End: 2023-12-07

## 2023-12-07 NOTE — TELEPHONE ENCOUNTER
Pt has appointment on Monday and wants to know if Dr Beth Fritz could order a total blood count.     Please advise

## 2023-12-08 DIAGNOSIS — Z12.5 SCREENING FOR PROSTATE CANCER: ICD-10-CM

## 2023-12-08 DIAGNOSIS — E78.2 MIXED HYPERLIPIDEMIA: Primary | ICD-10-CM

## 2023-12-08 DIAGNOSIS — R73.9 HYPERGLYCEMIA: ICD-10-CM

## 2023-12-08 DIAGNOSIS — E78.2 MIXED HYPERLIPIDEMIA: ICD-10-CM

## 2023-12-08 LAB
ALBUMIN SERPL-MCNC: 4.4 G/DL (ref 3.4–5)
ALBUMIN/GLOB SERPL: 2.6 {RATIO} (ref 1.1–2.2)
ALP SERPL-CCNC: 81 U/L (ref 40–129)
ALT SERPL-CCNC: 13 U/L (ref 10–40)
ANION GAP SERPL CALCULATED.3IONS-SCNC: 9 MMOL/L (ref 3–16)
AST SERPL-CCNC: 16 U/L (ref 15–37)
BASOPHILS # BLD: 0.1 K/UL (ref 0–0.2)
BASOPHILS NFR BLD: 1.1 %
BILIRUB SERPL-MCNC: 0.4 MG/DL (ref 0–1)
BUN SERPL-MCNC: 18 MG/DL (ref 7–20)
CALCIUM SERPL-MCNC: 8.7 MG/DL (ref 8.3–10.6)
CHLORIDE SERPL-SCNC: 106 MMOL/L (ref 99–110)
CHOLEST SERPL-MCNC: 166 MG/DL (ref 0–199)
CO2 SERPL-SCNC: 28 MMOL/L (ref 21–32)
CREAT SERPL-MCNC: 1.2 MG/DL (ref 0.8–1.3)
DEPRECATED RDW RBC AUTO: 12.7 % (ref 12.4–15.4)
EOSINOPHIL # BLD: 0.2 K/UL (ref 0–0.6)
EOSINOPHIL NFR BLD: 3 %
GFR SERPLBLD CREATININE-BSD FMLA CKD-EPI: >60 ML/MIN/{1.73_M2}
GLUCOSE SERPL-MCNC: 106 MG/DL (ref 70–99)
HCT VFR BLD AUTO: 44 % (ref 40.5–52.5)
HDLC SERPL-MCNC: 50 MG/DL (ref 40–60)
HGB BLD-MCNC: 14.9 G/DL (ref 13.5–17.5)
LDLC SERPL CALC-MCNC: 99 MG/DL
LYMPHOCYTES # BLD: 1.5 K/UL (ref 1–5.1)
LYMPHOCYTES NFR BLD: 26.6 %
MCH RBC QN AUTO: 33.7 PG (ref 26–34)
MCHC RBC AUTO-ENTMCNC: 33.9 G/DL (ref 31–36)
MCV RBC AUTO: 99.3 FL (ref 80–100)
MONOCYTES # BLD: 0.6 K/UL (ref 0–1.3)
MONOCYTES NFR BLD: 9.8 %
NEUTROPHILS # BLD: 3.4 K/UL (ref 1.7–7.7)
NEUTROPHILS NFR BLD: 59.5 %
PLATELET # BLD AUTO: 208 K/UL (ref 135–450)
PMV BLD AUTO: 8.2 FL (ref 5–10.5)
POTASSIUM SERPL-SCNC: 4.9 MMOL/L (ref 3.5–5.1)
PROT SERPL-MCNC: 6.1 G/DL (ref 6.4–8.2)
PSA SERPL DL<=0.01 NG/ML-MCNC: 1.55 NG/ML (ref 0–4)
RBC # BLD AUTO: 4.43 M/UL (ref 4.2–5.9)
SODIUM SERPL-SCNC: 143 MMOL/L (ref 136–145)
TRIGL SERPL-MCNC: 84 MG/DL (ref 0–150)
TSH SERPL DL<=0.005 MIU/L-ACNC: 2.25 UIU/ML (ref 0.27–4.2)
VLDLC SERPL CALC-MCNC: 17 MG/DL
WBC # BLD AUTO: 5.7 K/UL (ref 4–11)

## 2023-12-09 LAB
EST. AVERAGE GLUCOSE BLD GHB EST-MCNC: 102.5 MG/DL
HBA1C MFR BLD: 5.2 %

## 2023-12-11 ENCOUNTER — OFFICE VISIT (OUTPATIENT)
Dept: PRIMARY CARE CLINIC | Age: 68
End: 2023-12-11
Payer: MEDICARE

## 2023-12-11 VITALS
OXYGEN SATURATION: 99 % | RESPIRATION RATE: 12 BRPM | SYSTOLIC BLOOD PRESSURE: 120 MMHG | BODY MASS INDEX: 27.48 KG/M2 | HEART RATE: 53 BPM | TEMPERATURE: 97.5 F | WEIGHT: 214 LBS | DIASTOLIC BLOOD PRESSURE: 80 MMHG

## 2023-12-11 DIAGNOSIS — N40.1 BENIGN PROSTATIC HYPERPLASIA WITH URINARY FREQUENCY: Chronic | ICD-10-CM

## 2023-12-11 DIAGNOSIS — N50.811 RIGHT TESTICULAR PAIN: Primary | ICD-10-CM

## 2023-12-11 DIAGNOSIS — I82.431 ACUTE DEEP VEIN THROMBOSIS (DVT) OF POPLITEAL VEIN OF RIGHT LOWER EXTREMITY (HCC): ICD-10-CM

## 2023-12-11 DIAGNOSIS — R35.0 BENIGN PROSTATIC HYPERPLASIA WITH URINARY FREQUENCY: Chronic | ICD-10-CM

## 2023-12-11 PROCEDURE — G8427 DOCREV CUR MEDS BY ELIG CLIN: HCPCS | Performed by: INTERNAL MEDICINE

## 2023-12-11 PROCEDURE — G8419 CALC BMI OUT NRM PARAM NOF/U: HCPCS | Performed by: INTERNAL MEDICINE

## 2023-12-11 PROCEDURE — 99213 OFFICE O/P EST LOW 20 MIN: CPT | Performed by: INTERNAL MEDICINE

## 2023-12-11 PROCEDURE — G8484 FLU IMMUNIZE NO ADMIN: HCPCS | Performed by: INTERNAL MEDICINE

## 2023-12-11 PROCEDURE — 1036F TOBACCO NON-USER: CPT | Performed by: INTERNAL MEDICINE

## 2023-12-11 PROCEDURE — 3017F COLORECTAL CA SCREEN DOC REV: CPT | Performed by: INTERNAL MEDICINE

## 2023-12-11 PROCEDURE — 1123F ACP DISCUSS/DSCN MKR DOCD: CPT | Performed by: INTERNAL MEDICINE

## 2023-12-11 RX ORDER — CIPROFLOXACIN 250 MG/1
250 TABLET, FILM COATED ORAL 2 TIMES DAILY
Qty: 20 TABLET | Refills: 0 | Status: SHIPPED | OUTPATIENT
Start: 2023-12-11 | End: 2023-12-21

## 2023-12-11 ASSESSMENT — PATIENT HEALTH QUESTIONNAIRE - PHQ9
SUM OF ALL RESPONSES TO PHQ QUESTIONS 1-9: 0
SUM OF ALL RESPONSES TO PHQ QUESTIONS 1-9: 0
2. FEELING DOWN, DEPRESSED OR HOPELESS: 0
SUM OF ALL RESPONSES TO PHQ9 QUESTIONS 1 & 2: 0
SUM OF ALL RESPONSES TO PHQ QUESTIONS 1-9: 0
SUM OF ALL RESPONSES TO PHQ QUESTIONS 1-9: 0
1. LITTLE INTEREST OR PLEASURE IN DOING THINGS: 0

## 2023-12-11 NOTE — ASSESSMENT & PLAN NOTE
Has been having this for 2 months,   Worse when he sits , dull aching pain, sometimes stabbing pain,   No lumps,  No masses,  No trauma,  No urethral symptoms   No urinary symptoms   Possible epididymitis   Will start empiric antibiotics

## 2023-12-11 NOTE — PROGRESS NOTES
pain, sometimes stabbing pain,   No lumps,  No masses,  No trauma,  No urethral symptoms   No urinary symptoms   Possible epididymitis   Will start empiric antibiotics        Acute deep vein thrombosis (DVT) of popliteal vein of right lower extremity ([address])  On eliquis. Benign prostatic hyperplasia with urinary frequency  No meds,  Continue current medications.          Plan:      As above,         Thomas Leiva MD

## 2024-01-10 NOTE — TELEPHONE ENCOUNTER
Pt called to get meds refill. Please contact Pt when completed.  apixaban (ELIQUIS) 5 MG TABS tablet   [facility] [id number] - [place], [place] - [address] - P [phone] - F [phone] [[id number]]

## 2024-01-10 NOTE — TELEPHONE ENCOUNTER
Medication:   Requested Prescriptions     Pending Prescriptions Disp Refills    apixaban (ELIQUIS) 5 MG TABS tablet 60 tablet 5     Sig: Take 1 tablet by mouth 2 times daily     Last Filled:  07/18/2023    Last appt: 12/11/2023   Next appt: Visit date not found    Last OARRS:        No data to display

## 2024-06-20 ENCOUNTER — TELEPHONE (OUTPATIENT)
Dept: PRIMARY CARE CLINIC | Age: 69
End: 2024-06-20

## 2024-06-20 NOTE — TELEPHONE ENCOUNTER
He just had BW in December. He does not need BW. Everything was normal in December. He wont need BW till December.

## 2024-06-25 ENCOUNTER — OFFICE VISIT (OUTPATIENT)
Dept: PRIMARY CARE CLINIC | Age: 69
End: 2024-06-25
Payer: MEDICARE

## 2024-06-25 VITALS
WEIGHT: 210.2 LBS | OXYGEN SATURATION: 97 % | HEART RATE: 56 BPM | BODY MASS INDEX: 29.43 KG/M2 | SYSTOLIC BLOOD PRESSURE: 128 MMHG | DIASTOLIC BLOOD PRESSURE: 64 MMHG | TEMPERATURE: 97.8 F | HEIGHT: 71 IN

## 2024-06-25 DIAGNOSIS — I82.431 ACUTE DEEP VEIN THROMBOSIS (DVT) OF POPLITEAL VEIN OF RIGHT LOWER EXTREMITY (HCC): ICD-10-CM

## 2024-06-25 DIAGNOSIS — N40.1 BENIGN PROSTATIC HYPERPLASIA WITH URINARY FREQUENCY: Chronic | ICD-10-CM

## 2024-06-25 DIAGNOSIS — Z00.00 MEDICARE ANNUAL WELLNESS VISIT, SUBSEQUENT: Primary | ICD-10-CM

## 2024-06-25 DIAGNOSIS — R35.0 BENIGN PROSTATIC HYPERPLASIA WITH URINARY FREQUENCY: Chronic | ICD-10-CM

## 2024-06-25 PROCEDURE — G0439 PPPS, SUBSEQ VISIT: HCPCS | Performed by: INTERNAL MEDICINE

## 2024-06-25 PROCEDURE — 3017F COLORECTAL CA SCREEN DOC REV: CPT | Performed by: INTERNAL MEDICINE

## 2024-06-25 PROCEDURE — 1123F ACP DISCUSS/DSCN MKR DOCD: CPT | Performed by: INTERNAL MEDICINE

## 2024-06-25 ASSESSMENT — PATIENT HEALTH QUESTIONNAIRE - PHQ9
10. IF YOU CHECKED OFF ANY PROBLEMS, HOW DIFFICULT HAVE THESE PROBLEMS MADE IT FOR YOU TO DO YOUR WORK, TAKE CARE OF THINGS AT HOME, OR GET ALONG WITH OTHER PEOPLE: NOT DIFFICULT AT ALL
4. FEELING TIRED OR HAVING LITTLE ENERGY: NOT AT ALL
SUM OF ALL RESPONSES TO PHQ QUESTIONS 1-9: 0
6. FEELING BAD ABOUT YOURSELF - OR THAT YOU ARE A FAILURE OR HAVE LET YOURSELF OR YOUR FAMILY DOWN: NOT AT ALL
SUM OF ALL RESPONSES TO PHQ QUESTIONS 1-9: 0
SUM OF ALL RESPONSES TO PHQ QUESTIONS 1-9: 0
SUM OF ALL RESPONSES TO PHQ9 QUESTIONS 1 & 2: 0
9. THOUGHTS THAT YOU WOULD BE BETTER OFF DEAD, OR OF HURTING YOURSELF: NOT AT ALL
1. LITTLE INTEREST OR PLEASURE IN DOING THINGS: NOT AT ALL
3. TROUBLE FALLING OR STAYING ASLEEP: NOT AT ALL
2. FEELING DOWN, DEPRESSED OR HOPELESS: NOT AT ALL
SUM OF ALL RESPONSES TO PHQ QUESTIONS 1-9: 0
8. MOVING OR SPEAKING SO SLOWLY THAT OTHER PEOPLE COULD HAVE NOTICED. OR THE OPPOSITE, BEING SO FIGETY OR RESTLESS THAT YOU HAVE BEEN MOVING AROUND A LOT MORE THAN USUAL: NOT AT ALL
5. POOR APPETITE OR OVEREATING: NOT AT ALL
7. TROUBLE CONCENTRATING ON THINGS, SUCH AS READING THE NEWSPAPER OR WATCHING TELEVISION: NOT AT ALL

## 2024-06-25 ASSESSMENT — LIFESTYLE VARIABLES
HOW OFTEN DO YOU HAVE A DRINK CONTAINING ALCOHOL: NEVER
HOW MANY STANDARD DRINKS CONTAINING ALCOHOL DO YOU HAVE ON A TYPICAL DAY: PATIENT DOES NOT DRINK

## 2024-06-25 NOTE — PATIENT INSTRUCTIONS
your doctor if you are having problems. It's also a good idea to know your test results and keep a list of the medicines you take.  How can you care for yourself at home?  Diet    Use less salt when you cook and eat. This helps lower your blood pressure. Taste food before salting. Add only a little salt when you think you need it. With time, your taste buds will adjust to less salt.     Eat fewer snack items, fast foods, canned soups, and other high-salt, high-fat, processed foods.     Read food labels and try to avoid saturated and trans fats. They increase your risk of heart disease by raising cholesterol levels.     Limit the amount of solid fat--butter, margarine, and shortening--you eat. Use olive, peanut, or canola oil when you cook. Bake, broil, and steam foods instead of frying them.     Eat a variety of fruit and vegetables every day. Dark green, deep orange, red, or yellow fruits and vegetables are especially good for you. Examples include spinach, carrots, peaches, and berries.     Foods high in fiber can reduce your cholesterol and provide important vitamins and minerals. High-fiber foods include whole-grain cereals and breads, oatmeal, beans, brown rice, citrus fruits, and apples.     Eat lean proteins. Heart-healthy proteins include seafood, lean meats and poultry, eggs, beans, peas, nuts, seeds, and soy products.     Limit drinks and foods with added sugar. These include candy, desserts, and soda pop.   Heart-healthy lifestyle    If your doctor recommends it, get more exercise. For many people, walking is a good choice. Or you may want to swim, bike, or do other activities. Bit by bit, increase the time you're active every day. Try for at least 30 minutes on most days of the week.     Try to quit or cut back on using tobacco and other nicotine products. This includes smoking and vaping. If you need help quitting, talk to your doctor about stop-smoking programs and medicines. These can increase your

## 2024-06-25 NOTE — ASSESSMENT & PLAN NOTE
On eliquis,  Patient is compliant w medications, no side effects, effective, provides adequate symptom relief. No new symptoms or problems as noted by patient.  The problem is stable, no changes noted by patient. Will consider monitoring labs and refill medications as appropriate. Patient counseled and will continue current plan.

## 2024-06-25 NOTE — PROGRESS NOTES
Medicare Annual Wellness Visit    Gustavo Suero is here for Medicare AWV    Assessment & Plan   Here for a AWV   Recommendations for Preventive Services Due: see orders and patient instructions/AVS.  Recommended screening schedule for the next 5-10 years is provided to the patient in written form: see Patient Instructions/AVS.     No follow-ups on file.     Subjective   The following acute and/or chronic problems were also addressed today:    Patient's complete Health Risk Assessment and screening values have been reviewed and are found in Flowsheets. The following problems were reviewed today and where indicated follow up appointments were made and/or referrals ordered.    Positive Risk Factor Screenings with Interventions:                 Dentist Screen:  Have you seen the dentist within the past year?: (!) No    Intervention:  Patient declines any further evaluation or treatment     Vision Screen:  Do you have difficulty driving, watching TV, or doing any of your daily activities because of your eyesight?: (!) Yes  Have you had an eye exam within the past year?: (!) No  No results found.    Interventions:   Patient declines any further evaluation or treatment    Safety:  Do you have non-slip mats or non-slip surfaces or shower bars or grab bars in your shower or bathtub?: (!) No    Interventions:  Patient declined any further interventions or treatment     Advanced Directives:  Do you have a Living Will?: (!) No    Intervention:  has NO advanced directive - information provided                     Objective   Vitals:    06/25/24 0839   BP: 128/64   Site: Right Upper Arm   Position: Sitting   Cuff Size: Medium Adult   Pulse: 56   Temp: 97.8 °F (36.6 °C)   TempSrc: Infrared   SpO2: 97%   Weight: 95.3 kg (210 lb 3.2 oz)   Height: 1.81 m (5' 11.26\")      Body mass index is 29.1 kg/m².      General Appearance: alert and oriented to person, place and time, well developed and well- nourished, in no acute distress  Skin:

## 2024-10-18 ENCOUNTER — TELEPHONE (OUTPATIENT)
Dept: PRIMARY CARE CLINIC | Age: 69
End: 2024-10-18

## 2024-10-18 NOTE — TELEPHONE ENCOUNTER
No need to stop eliquis that long. Just stop it 2-3 days before surgery and resume a day after surgery.

## 2024-10-18 NOTE — TELEPHONE ENCOUNTER
Pt called to speak with Dr Mays about his left shoulder surgery.Pt's alberto advised him to stop taking his apixaban (ELIQUIS) 5 MG TABS tablet .   5-7 days before his surgery. Pt is wanting to know what dr Mays thinks about this. Please contact pt about this concern.

## 2024-12-04 ENCOUNTER — OFFICE VISIT (OUTPATIENT)
Dept: PRIMARY CARE CLINIC | Age: 69
End: 2024-12-04

## 2024-12-04 VITALS
HEIGHT: 73 IN | BODY MASS INDEX: 27.57 KG/M2 | HEART RATE: 56 BPM | OXYGEN SATURATION: 99 % | RESPIRATION RATE: 13 BRPM | DIASTOLIC BLOOD PRESSURE: 80 MMHG | TEMPERATURE: 97.1 F | SYSTOLIC BLOOD PRESSURE: 130 MMHG | WEIGHT: 208 LBS

## 2024-12-04 DIAGNOSIS — G89.29 CHRONIC LEFT SHOULDER PAIN: Chronic | ICD-10-CM

## 2024-12-04 DIAGNOSIS — N40.1 BENIGN PROSTATIC HYPERPLASIA WITH URINARY FREQUENCY: Chronic | ICD-10-CM

## 2024-12-04 DIAGNOSIS — R35.0 BENIGN PROSTATIC HYPERPLASIA WITH URINARY FREQUENCY: Chronic | ICD-10-CM

## 2024-12-04 DIAGNOSIS — M25.512 CHRONIC LEFT SHOULDER PAIN: Chronic | ICD-10-CM

## 2024-12-04 DIAGNOSIS — I82.431 ACUTE DEEP VEIN THROMBOSIS (DVT) OF POPLITEAL VEIN OF RIGHT LOWER EXTREMITY (HCC): ICD-10-CM

## 2024-12-04 DIAGNOSIS — Z01.818 PRE-OP EVALUATION: Primary | ICD-10-CM

## 2024-12-04 SDOH — ECONOMIC STABILITY: FOOD INSECURITY: WITHIN THE PAST 12 MONTHS, THE FOOD YOU BOUGHT JUST DIDN'T LAST AND YOU DIDN'T HAVE MONEY TO GET MORE.: NEVER TRUE

## 2024-12-04 SDOH — ECONOMIC STABILITY: INCOME INSECURITY: HOW HARD IS IT FOR YOU TO PAY FOR THE VERY BASICS LIKE FOOD, HOUSING, MEDICAL CARE, AND HEATING?: NOT HARD AT ALL

## 2024-12-04 SDOH — ECONOMIC STABILITY: FOOD INSECURITY: WITHIN THE PAST 12 MONTHS, YOU WORRIED THAT YOUR FOOD WOULD RUN OUT BEFORE YOU GOT MONEY TO BUY MORE.: NEVER TRUE

## 2024-12-04 ASSESSMENT — PATIENT HEALTH QUESTIONNAIRE - PHQ9
2. FEELING DOWN, DEPRESSED OR HOPELESS: NOT AT ALL
SUM OF ALL RESPONSES TO PHQ QUESTIONS 1-9: 0
SUM OF ALL RESPONSES TO PHQ QUESTIONS 1-9: 0
SUM OF ALL RESPONSES TO PHQ9 QUESTIONS 1 & 2: 0
SUM OF ALL RESPONSES TO PHQ QUESTIONS 1-9: 0
SUM OF ALL RESPONSES TO PHQ QUESTIONS 1-9: 0
1. LITTLE INTEREST OR PLEASURE IN DOING THINGS: NOT AT ALL

## 2024-12-04 NOTE — PROGRESS NOTES
radial=2+,, femoral=2+, popliteal=2+, dorsalis pedis=2+,  Neuro - Gait normal. Reflexes normal and symmetric. Sensation grossly normal.  No focal weakness    EKG:   BLOOD WORK:     Assessment:      Pre op eval  Chronic left shoulder pain  Here for a pre op eval for left shoulder replacement surgery  He is medically stable.      Benign prostatic hyperplasia with urinary frequency  Patient is compliant w medications, no side effects, effective, provides adequate symptom relief. No new symptoms or problems as noted by patient.  The problem is stable, no changes noted by patient. Will consider monitoring labs and refill medications as appropriate. Patient counseled and will continue current plan.      Acute deep vein thrombosis (DVT) of popliteal vein of right lower extremity (HCC)  On eliquis,  Need to hold 2-3 days prior to surgery                 Plan:        He is medically cleared for surgery and anesthesia.  Hold eliquis 2 days prior to surgery and can restart 1 day after surgery.   Per operating surgeon.  See also orders filed with this encounter, if any.  Instructions to patient: Do not take any NSAIDS 1 week prior to surgery.  Indication for pedro-operative beta blocker therapy: N/A    I spent 40-54 minutes in total time on the day of the encounter including the pre-encounter time, face-face encounter time, reviewing the medical history, labs and imaging, and post-encounter time, for the care and pre op clearance of this individual patient ( not separately reported).    Stefano Mays MD   12/4/2024 9:41 AM

## 2025-01-07 RX ORDER — APIXABAN 5 MG/1
5 TABLET, FILM COATED ORAL 2 TIMES DAILY
Qty: 60 TABLET | Refills: 5 | Status: SHIPPED | OUTPATIENT
Start: 2025-01-07

## 2025-01-07 NOTE — TELEPHONE ENCOUNTER
Medication:   Requested Prescriptions     Pending Prescriptions Disp Refills    ELIQUIS 5 MG TABS tablet [Pharmacy Med Name: ELIQUIS 5 MG TABLET] 60 tablet 5     Sig: TAKE 1 TABLET BY MOUTH 2 TIMES A DAY     Last Filled:  6/25/2024    Last appt: 12/4/2024   Next appt: Visit date not found    Last OARRS:        No data to display